# Patient Record
Sex: FEMALE | Race: BLACK OR AFRICAN AMERICAN | NOT HISPANIC OR LATINO | Employment: FULL TIME | URBAN - METROPOLITAN AREA
[De-identification: names, ages, dates, MRNs, and addresses within clinical notes are randomized per-mention and may not be internally consistent; named-entity substitution may affect disease eponyms.]

---

## 2018-07-07 ENCOUNTER — HOSPITAL ENCOUNTER (EMERGENCY)
Facility: HOSPITAL | Age: 33
Discharge: HOME | End: 2018-07-07
Attending: EMERGENCY MEDICINE
Payer: COMMERCIAL

## 2018-07-07 VITALS
TEMPERATURE: 98 F | WEIGHT: 255 LBS | RESPIRATION RATE: 18 BRPM | SYSTOLIC BLOOD PRESSURE: 136 MMHG | OXYGEN SATURATION: 100 % | BODY MASS INDEX: 42.43 KG/M2 | HEART RATE: 57 BPM | DIASTOLIC BLOOD PRESSURE: 70 MMHG

## 2018-07-07 DIAGNOSIS — R19.7 NAUSEA VOMITING AND DIARRHEA: Primary | ICD-10-CM

## 2018-07-07 DIAGNOSIS — R11.2 NAUSEA VOMITING AND DIARRHEA: Primary | ICD-10-CM

## 2018-07-07 LAB
ALBUMIN SERPL BCP-MCNC: 3.8 G/DL
ALP SERPL-CCNC: 88 U/L
ALT SERPL W/O P-5'-P-CCNC: 23 U/L
ANION GAP SERPL CALC-SCNC: 12 MMOL/L
AST SERPL-CCNC: 30 U/L
B-HCG UR QL: NEGATIVE
BILIRUB SERPL-MCNC: 0.5 MG/DL
BUN SERPL-MCNC: 8 MG/DL
CALCIUM SERPL-MCNC: 9.4 MG/DL
CHLORIDE SERPL-SCNC: 110 MMOL/L
CO2 SERPL-SCNC: 19 MMOL/L
CREAT SERPL-MCNC: 0.8 MG/DL
CTP QC/QA: YES
EST. GFR  (AFRICAN AMERICAN): >60 ML/MIN/1.73 M^2
EST. GFR  (NON AFRICAN AMERICAN): >60 ML/MIN/1.73 M^2
GLUCOSE SERPL-MCNC: 105 MG/DL
LIPASE SERPL-CCNC: 25 U/L
POTASSIUM SERPL-SCNC: 3.8 MMOL/L
PROT SERPL-MCNC: 7.9 G/DL
SODIUM SERPL-SCNC: 141 MMOL/L

## 2018-07-07 PROCEDURE — 25000003 PHARM REV CODE 250: Performed by: EMERGENCY MEDICINE

## 2018-07-07 PROCEDURE — 99284 EMERGENCY DEPT VISIT MOD MDM: CPT | Mod: 25

## 2018-07-07 PROCEDURE — 96372 THER/PROPH/DIAG INJ SC/IM: CPT

## 2018-07-07 PROCEDURE — 63600175 PHARM REV CODE 636 W HCPCS: Performed by: EMERGENCY MEDICINE

## 2018-07-07 PROCEDURE — 83690 ASSAY OF LIPASE: CPT

## 2018-07-07 PROCEDURE — 36415 COLL VENOUS BLD VENIPUNCTURE: CPT

## 2018-07-07 PROCEDURE — 96374 THER/PROPH/DIAG INJ IV PUSH: CPT

## 2018-07-07 PROCEDURE — 81025 URINE PREGNANCY TEST: CPT | Performed by: EMERGENCY MEDICINE

## 2018-07-07 PROCEDURE — 96361 HYDRATE IV INFUSION ADD-ON: CPT

## 2018-07-07 PROCEDURE — 80053 COMPREHEN METABOLIC PANEL: CPT

## 2018-07-07 RX ORDER — DIPHENOXYLATE HYDROCHLORIDE AND ATROPINE SULFATE 2.5; .025 MG/1; MG/1
1 TABLET ORAL
Status: COMPLETED | OUTPATIENT
Start: 2018-07-07 | End: 2018-07-07

## 2018-07-07 RX ORDER — ONDANSETRON 2 MG/ML
8 INJECTION INTRAMUSCULAR; INTRAVENOUS
Status: COMPLETED | OUTPATIENT
Start: 2018-07-07 | End: 2018-07-07

## 2018-07-07 RX ORDER — DIPHENOXYLATE HYDROCHLORIDE AND ATROPINE SULFATE 2.5; .025 MG/1; MG/1
1 TABLET ORAL 3 TIMES DAILY PRN
Qty: 12 TABLET | Refills: 0 | Status: SHIPPED | OUTPATIENT
Start: 2018-07-07 | End: 2018-07-17

## 2018-07-07 RX ORDER — ONDANSETRON 4 MG/1
4 TABLET, ORALLY DISINTEGRATING ORAL EVERY 8 HOURS PRN
Qty: 12 TABLET | Refills: 0 | Status: SHIPPED | OUTPATIENT
Start: 2018-07-07 | End: 2018-10-02

## 2018-07-07 RX ORDER — DICYCLOMINE HYDROCHLORIDE 10 MG/ML
20 INJECTION INTRAMUSCULAR
Status: COMPLETED | OUTPATIENT
Start: 2018-07-07 | End: 2018-07-07

## 2018-07-07 RX ORDER — DICYCLOMINE HYDROCHLORIDE 20 MG/1
20 TABLET ORAL 2 TIMES DAILY PRN
Qty: 20 TABLET | Refills: 0 | Status: SHIPPED | OUTPATIENT
Start: 2018-07-07 | End: 2018-08-06

## 2018-07-07 RX ADMIN — DICYCLOMINE HYDROCHLORIDE 20 MG: 10 INJECTION INTRAMUSCULAR at 12:07

## 2018-07-07 RX ADMIN — SODIUM CHLORIDE 1000 ML: 0.9 INJECTION, SOLUTION INTRAVENOUS at 12:07

## 2018-07-07 RX ADMIN — DIPHENOXYLATE HYDROCHLORIDE AND ATROPINE SULFATE 1 TABLET: 2.5; .025 TABLET ORAL at 12:07

## 2018-07-07 RX ADMIN — ONDANSETRON HYDROCHLORIDE 8 MG: 2 INJECTION INTRAMUSCULAR; INTRAVENOUS at 12:07

## 2018-07-07 NOTE — ED PROVIDER NOTES
Encounter Date: 2018    SCRIBE #1 NOTE: I, Leigha Cannon, am scribing for, and in the presence of, Dr. Goff.       History     Chief Complaint   Patient presents with    Diarrhea     C/o diarrhea since Tuesday    Emesis     Onset today.        2018  11:44 AM    Chief Complaint: Diarrhea      The patient is a 32 y.o. female who presents with persistent diarrhea for 4 days with associated loss of appetite, nausea, vomiting. Pt reports her diarrhea is clear. Her fiance and baby were sick with similar symptoms, but have resolved. Pt took Imodium yesterday and today at 0500, but this has not provided relief. No other exacerbating or alleviating factors. Denies fever. PMHx of Hypertension and PCOS. PSHx of  section. Negative pregnancy test at home.       The history is provided by the patient.     Review of patient's allergies indicates:  No Known Allergies  Past Medical History:   Diagnosis Date    Hypertension     PCOS (polycystic ovarian syndrome)      Past Surgical History:   Procedure Laterality Date     SECTION       History reviewed. No pertinent family history.  Social History   Substance Use Topics    Smoking status: Never Smoker    Smokeless tobacco: Not on file    Alcohol use Not on file     Review of Systems   Constitutional: Positive for appetite change. Negative for fever.   Gastrointestinal: Positive for abdominal pain, diarrhea, nausea and vomiting. Abdominal distention: mild.   Genitourinary: Negative for dysuria, flank pain and vaginal bleeding.   Musculoskeletal: Negative for myalgias.   Neurological: Negative for headaches.   Hematological: Negative for adenopathy.       Physical Exam     Initial Vitals [18 1108]   BP Pulse Resp Temp SpO2   (!) 160/90 60 16 98.3 °F (36.8 °C) 100 %      MAP       --         Physical Exam    Nursing note and vitals reviewed.  Constitutional: She appears well-developed and well-nourished.   HENT:   Head: Normocephalic and  atraumatic.   Eyes: EOM are normal.   Neck: Normal range of motion. Neck supple.   Cardiovascular: Normal rate, regular rhythm and normal heart sounds. Exam reveals no gallop and no friction rub.    No murmur heard.  Pulmonary/Chest: Breath sounds normal. No respiratory distress. She has no wheezes. She has no rhonchi. She has no rales.   Abdominal: Soft. Bowel sounds are normal. There is no tenderness.   No abdominal tenderness   Musculoskeletal: Normal range of motion.   Neurological: She is alert and oriented to person, place, and time.   Skin: Skin is warm and dry.   Psychiatric: She has a normal mood and affect. Her behavior is normal. Judgment and thought content normal.         ED Course   Procedures  Labs Reviewed - No data to display       Imaging Results    None          Medical Decision Making:   History:   Old Medical Records: I decided to obtain old medical records.  Clinical Tests:   Lab Tests: Ordered and Reviewed            Scribe Attestation:   Scribe #1: I performed the above scribed service and the documentation accurately describes the services I performed. I attest to the accuracy of the note.    I, Dr. Goff, personally performed the services described in this documentation. All medical record entries made by the scribe were at my direction and in my presence.  I have reviewed the chart and agree that the record reflects my personal performance and is accurate and complete.2:57 PM 07/07/2018            ED Course as of Jul 07 1457   Sat Jul 07, 2018   1116 BP: (!) 160/90 [EF]   1116 Temp: 98.3 °F (36.8 °C) [EF]   1116 Pulse: 60 [EF]   1116 Resp: 16 [EF]   1116 SpO2: 100 % [EF]   1207 Preg Test, Ur: Negative [EF]   1247 CO2: (!) 19 [EF]   1323 Feeling better  [EF]      ED Course User Index  [EF] Jose Cruz Goff MD     Clinical Impression:   The encounter diagnosis was Nausea vomiting and diarrhea.        32-year-old female presents with several days of nausea vomiting and diarrhea.  Multiple  family members sick with similar symptoms.  Abdominal exam is not peritoneal.  Labs demonstrate a very mild dehydration, patient feels much better after IV fluids and medications.  Symptoms almost certainly viral.  No indication for hospitalization or imaging.  No vomiting or diarrhea in the emergency room.                      Jose Cruz Goff MD  07/07/18 9838

## 2018-10-02 ENCOUNTER — OFFICE VISIT (OUTPATIENT)
Dept: SURGERY | Facility: CLINIC | Age: 33
End: 2018-10-02
Payer: COMMERCIAL

## 2018-10-02 VITALS
BODY MASS INDEX: 42.49 KG/M2 | HEIGHT: 65 IN | WEIGHT: 255 LBS | DIASTOLIC BLOOD PRESSURE: 84 MMHG | SYSTOLIC BLOOD PRESSURE: 140 MMHG

## 2018-10-02 DIAGNOSIS — K62.0 ANAL POLYP: Primary | ICD-10-CM

## 2018-10-02 LAB
BUN SERPL-MCNC: 13 MG/DL (ref 8–20)
CALCIUM SERPL-MCNC: 8.8 MG/DL (ref 7.7–10.4)
CHLORIDE: 104 MMOL/L (ref 98–110)
CO2 SERPL-SCNC: 26.1 MMOL/L (ref 22.8–31.6)
CREATININE: 0.76 MG/DL (ref 0.6–1.4)
GLUCOSE: 74 MG/DL (ref 70–99)
HCT VFR BLD AUTO: 39.6 % (ref 36–48)
HGB BLD-MCNC: 12.8 G/DL (ref 12–15)
MCH RBC QN AUTO: 27.5 PG (ref 25–35)
MCHC RBC AUTO-ENTMCNC: 32.3 G/DL (ref 31–36)
MCV RBC AUTO: 85 FL (ref 79–98)
NUCLEATED RBCS: 0 %
PLATELET # BLD AUTO: 293 K/UL (ref 140–440)
POTASSIUM SERPL-SCNC: 3.4 MMOL/L (ref 3.5–5)
RBC # BLD AUTO: 4.66 M/UL (ref 3.5–5.5)
SODIUM: 139 MMOL/L (ref 134–144)
WBC # BLD AUTO: 8.9 K/UL (ref 5–10)

## 2018-10-02 PROCEDURE — 99243 OFF/OP CNSLTJ NEW/EST LOW 30: CPT | Mod: ,,, | Performed by: SURGERY

## 2018-10-02 RX ORDER — HYDROCHLOROTHIAZIDE 25 MG/1
1 TABLET ORAL DAILY
COMMUNITY
Start: 2018-09-13 | End: 2019-08-17

## 2018-10-02 RX ORDER — METOPROLOL SUCCINATE 100 MG/1
1 TABLET, EXTENDED RELEASE ORAL DAILY
Status: ON HOLD | COMMUNITY
Start: 2018-09-13 | End: 2019-08-19 | Stop reason: SDUPTHER

## 2018-10-02 NOTE — PROGRESS NOTES
Subjective:       Patient ID: Nani Orr is a 33 y.o. female.    Chief Complaint: Consult (Referred by Dr. Patrick Rosales to eval hemorrhoids )      HPI:  Hemorrhoids: Patient complains of evaluation of possible hemorrhoids. Onset of symptoms was 3 years ago ago with unchanged course since that time.  She describes symptoms as perianal tags. Treatment to date has been none. Patient denies family hx of colorectal CA, maroon colored stools and melena.      Patient complains of a 3-4 year history of a anal lump. It occasionally becomes irritated and more inflamed and gets larger. It never bleeds. She does not have a problem with constipation or spotting of blood. It is mostly a cleanliness issue as it is in the way and never reduces. Patient tolerates regular diet. She denies abdominal pain or change in bowel habits.      Past Medical History:   Diagnosis Date    Hypertension     PCOS (polycystic ovarian syndrome)      Past Surgical History:   Procedure Laterality Date     SECTION  2009     SECTION  2016     Review of patient's allergies indicates:  No Known Allergies     Medication List           Accurate as of 10/2/18 11:22 AM. If you have any questions, ask your nurse or doctor.               CONTINUE taking these medications    hydroCHLOROthiazide 25 MG tablet  Commonly known as:  HYDRODIURIL     metoprolol succinate 100 MG 24 hr tablet  Commonly known as:  TOPROL-XL        STOP taking these medications    BYSTOLIC 10 MG Tab  Generic drug:  nebivolol  Stopped by:  Alethea Chan MD     norethindrone-ethinyl estradiol 1 mg-20 mcg (21)/75 mg (7) per tablet  Commonly known as:  JUNEL   Stopped by:  Alethea Chan MD     ondansetron 4 MG Tbdl  Commonly known as:  ZOFRAN-ODT  Stopped by:  Alethea Chan MD          Family History   Problem Relation Age of Onset    Hypertension Mother     Breast cancer Maternal Aunt     Heart disease Maternal Grandmother       Social History     Socioeconomic History    Marital status: Single     Spouse name: None    Number of children: None    Years of education: None    Highest education level: None   Social Needs    Financial resource strain: None    Food insecurity - worry: None    Food insecurity - inability: None    Transportation needs - medical: None    Transportation needs - non-medical: None   Occupational History    None   Tobacco Use    Smoking status: Never Smoker    Smokeless tobacco: Never Used   Substance and Sexual Activity    Alcohol use: Yes     Comment: Socially     Drug use: No    Sexual activity: None   Other Topics Concern    None   Social History Narrative    None         Review of Systems   Constitutional: Negative for appetite change, chills, fever and unexpected weight change.   HENT: Negative for hearing loss, rhinorrhea, sore throat and voice change.    Eyes: Negative for photophobia and visual disturbance.   Respiratory: Negative for cough, choking and shortness of breath.    Cardiovascular: Negative for chest pain, palpitations and leg swelling.   Gastrointestinal: Positive for rectal pain. Negative for abdominal pain, blood in stool, constipation, diarrhea, nausea and vomiting.   Endocrine: Negative for cold intolerance, heat intolerance, polydipsia and polyuria.   Musculoskeletal: Negative for arthralgias, back pain, joint swelling and neck stiffness.   Skin: Negative for color change, pallor and rash.   Neurological: Negative for dizziness, seizures, syncope and headaches.   Hematological: Negative for adenopathy. Does not bruise/bleed easily.   Psychiatric/Behavioral: Negative for agitation, behavioral problems and confusion.       Objective:      Physical Exam   Constitutional: She appears well-developed and well-nourished.  Non-toxic appearance. No distress.   HENT:   Head: Normocephalic and atraumatic. Head is without abrasion and without laceration.   Right Ear: External ear  normal.   Left Ear: External ear normal.   Nose: Nose normal.   Mouth/Throat: Oropharynx is clear and moist.   Eyes: EOM are normal. Pupils are equal, round, and reactive to light.   Neck: Trachea normal. No tracheal deviation and normal range of motion present. No thyroid mass and no thyromegaly present.   Cardiovascular: Normal rate and regular rhythm.   Pulmonary/Chest: Effort normal. No accessory muscle usage. No tachypnea. No respiratory distress.   Abdominal: Soft. Normal appearance and bowel sounds are normal. She exhibits no distension and no mass. There is no hepatosplenomegaly. There is no tenderness. There is no tenderness at McBurney's point and negative Balbuena's sign. No hernia.   Genitourinary: Rectal exam shows external hemorrhoid.   Genitourinary Comments: Anal polyp vs ext hem, not inflamed or thrombosed   Lymphadenopathy:     She has no cervical adenopathy.     She has no axillary adenopathy.        Right: No inguinal adenopathy present.        Left: No inguinal adenopathy present.   Neurological: She is alert. Coordination and gait normal.   Skin: Skin is warm and intact.   Psychiatric: She has a normal mood and affect. Her speech is normal and behavior is normal.       Assessment/Plan:   Anal polyp  -     Ambulatory Referral to External Surgery  -     Basic metabolic panel; Future; Expected date: 10/02/2018  -     CBC Without Differential; Future; Expected date: 10/02/2018  -     POCT urine pregnancy; Future; Expected date: 10/02/2018  -     X-Ray Chest PA And Lateral        Planned procedure: Excision Anal Polyp/Ext Hemorrhoid    Mefoxin 2 gm IV on call to OR    NPO past midnight    Nathan cloth scrub per protocol    SCDs Bilateral Lower Extremities    I discussed the proposed procedures the the patient including risks, benefits, indications, alternatives and special concerns.  The patient appears to understand and agrees to go ahead with surgery.  I have made no promises, warranties or verbal  agreements beyond what was discussed above.    No Follow-up on file.

## 2018-10-02 NOTE — LETTER
October 2, 2018      Patrick Rosales MD  [address]  [address]  [place] [address]           [facility] - General Surgery  [address]  [address]  [place] [address]  Phone: [phone]  Fax: [phone]          Patient: Nani Orr   MR Number: [id number]   YOB: 1985   Date of Visit: 10/2/2018       Dear Dr. Patrick Rosales:    Thank you for referring Nani Orr to me for evaluation. Attached you will find relevant portions of my assessment and plan of care.    If you have questions, please do not hesitate to call me. I look forward to following Nani Orr along with you.    Sincerely,    Alethea Chan MD    Enclosure  CC:  No Recipients    If you would like to receive this communication electronically, please contact [email or web address] or [phone] to request more information on [employer] access.    For providers and/or their staff who would like to refer a patient to [facility], please contact us through our one-stop-shop provider referral line, [facility], at [phone].    If you feel you have received this communication in error or would no longer like to receive these types of communications, please e-mail [email or web address]

## 2018-10-15 LAB — POTASSIUM SERPL-SCNC: 3.3 MMOL/L (ref 3.5–5)

## 2018-10-16 LAB — B-HCG UR QL: NEGATIVE

## 2018-10-18 ENCOUNTER — TELEPHONE (OUTPATIENT)
Dept: SURGERY | Facility: CLINIC | Age: 33
End: 2018-10-18

## 2018-10-18 NOTE — TELEPHONE ENCOUNTER
----- Message from Alethea Chan MD sent at 10/18/2018 12:01 PM CDT -----  Call patient - path benign

## 2018-10-25 ENCOUNTER — TELEPHONE (OUTPATIENT)
Dept: SURGERY | Facility: CLINIC | Age: 33
End: 2018-10-25

## 2018-10-25 NOTE — TELEPHONE ENCOUNTER
Pt had excision of anal polyp 10/15/18. Calling office stating she is still having a lot of leakage from incision site which is a pinkish/mauve color. She also states it feels like she has a hard lump there. I notified pt I will call her back after speaking with Dr. Chan.

## 2018-10-26 NOTE — TELEPHONE ENCOUNTER
I spoke to pt yesterday and informed her per Dr. Chan to come in for earlier post op appt on 10/30/18 to [facility].

## 2018-11-02 ENCOUNTER — OFFICE VISIT (OUTPATIENT)
Dept: SURGERY | Facility: CLINIC | Age: 33
End: 2018-11-02
Payer: COMMERCIAL

## 2018-11-02 VITALS
SYSTOLIC BLOOD PRESSURE: 140 MMHG | BODY MASS INDEX: 50.06 KG/M2 | DIASTOLIC BLOOD PRESSURE: 84 MMHG | WEIGHT: 255 LBS | HEIGHT: 60 IN | RESPIRATION RATE: 5 BRPM

## 2018-11-02 DIAGNOSIS — K62.0 ANAL POLYP: Primary | ICD-10-CM

## 2018-11-02 PROCEDURE — 99024 POSTOP FOLLOW-UP VISIT: CPT | Mod: ,,, | Performed by: SURGERY

## 2018-11-02 NOTE — PROGRESS NOTES
Subjective:       Patient ID: Nani Orr is a 33 y.o. female.    Chief Complaint: Post-op Evaluation (FU DOS 10/15/18 EUA; Excision of anal polyp)      HPI:  Nani Orr is here for post-op. Patient has no systemic complaints. Post operative   pain is under control.  Tolerating diet, no nausea/vomiting.  Having normal bowel movements.    Pt noticed a small lump immediately after surgery.  Denies pain.  No bleeding.      Objective:      Physical Exam   Constitutional: She is oriented to person, place, and time. She is cooperative. No distress.   Genitourinary:   Genitourinary Comments: Small lump present at surgical site.  May be effect of suture that has not dissolved yet.   Neurological: She is alert and oriented to person, place, and time.   Skin:   Incisions are clean, dry and intact   There is no evidence of infection, hematoma or seroma.        Assessment/Plan:   Anal polyp    Path - fibroepithelial polyp with HPV effect, low grade anal intraepithelial neoplasm (AIN)    Discussed path with pt.  Needs surveillance as AIN can be precurser to anal cancer.  F/U in 1 year to schedule EUA, sooner if lump does not resolve by 6 months.      Follow-up in about 1 year (around 11/2/2019).

## 2018-11-02 NOTE — LETTER
November 2, 2018      Patrick Rosales MD  [address]  [address]  [place] [address]           [facility] - General Surgery  [address]  [address]  [place] [address]  Phone: [phone]  Fax: [phone]          Patient: Nani Orr   MR Number: [id number]   YOB: 1985   Date of Visit: 11/2/2018       Dear Dr. Patrick Rosales:    Thank you for referring Nani Orr to me for evaluation. Attached you will find relevant portions of my assessment and plan of care.    If you have questions, please do not hesitate to call me. I look forward to following Nani Orr along with you.    Sincerely,    Alethea Chan MD    Enclosure  CC:  No Recipients    If you would like to receive this communication electronically, please contact [email or web address] or [phone] to request more information on [employer] access.    For providers and/or their staff who would like to refer a patient to [facility], please contact us through our one-stop-shop provider referral line, [facility], at [phone].    If you feel you have received this communication in error or would no longer like to receive these types of communications, please e-mail [email or web address]

## 2019-08-17 ENCOUNTER — HOSPITAL ENCOUNTER (OUTPATIENT)
Facility: HOSPITAL | Age: 34
Discharge: HOME | End: 2019-08-19
Attending: EMERGENCY MEDICINE | Admitting: HOSPITALIST
Payer: COMMERCIAL

## 2019-08-17 DIAGNOSIS — I45.10 RIGHT BUNDLE BRANCH BLOCK: Primary | ICD-10-CM

## 2019-08-17 DIAGNOSIS — I10 ACCELERATED HYPERTENSION: ICD-10-CM

## 2019-08-17 DIAGNOSIS — R07.9 CHEST PAIN: ICD-10-CM

## 2019-08-17 DIAGNOSIS — R07.9 CHEST PAIN IN ADULT: ICD-10-CM

## 2019-08-17 LAB
ALBUMIN SERPL BCP-MCNC: 4.3 G/DL (ref 3.5–5.2)
ALP SERPL-CCNC: 75 U/L (ref 55–135)
ALT SERPL W/O P-5'-P-CCNC: 33 U/L (ref 10–44)
ANION GAP SERPL CALC-SCNC: 11 MMOL/L (ref 8–16)
AST SERPL-CCNC: 24 U/L (ref 10–40)
BASOPHILS # BLD AUTO: 0.04 K/UL (ref 0–0.2)
BASOPHILS NFR BLD: 0.5 % (ref 0–1.9)
BILIRUB SERPL-MCNC: 0.3 MG/DL (ref 0.1–1)
BNP SERPL-MCNC: 39 PG/ML (ref 0–99)
BUN SERPL-MCNC: 8 MG/DL (ref 6–20)
CALCIUM SERPL-MCNC: 10.2 MG/DL (ref 8.7–10.5)
CHLORIDE SERPL-SCNC: 106 MMOL/L (ref 95–110)
CO2 SERPL-SCNC: 26 MMOL/L (ref 23–29)
CREAT SERPL-MCNC: 0.9 MG/DL (ref 0.5–1.4)
DIFFERENTIAL METHOD: ABNORMAL
EOSINOPHIL # BLD AUTO: 0.3 K/UL (ref 0–0.5)
EOSINOPHIL NFR BLD: 3.8 % (ref 0–8)
ERYTHROCYTE [DISTWIDTH] IN BLOOD BY AUTOMATED COUNT: 12.4 % (ref 11.5–14.5)
EST. GFR  (AFRICAN AMERICAN): >60 ML/MIN/1.73 M^2
EST. GFR  (NON AFRICAN AMERICAN): >60 ML/MIN/1.73 M^2
GLUCOSE SERPL-MCNC: 62 MG/DL (ref 70–110)
HCT VFR BLD AUTO: 45.4 % (ref 37–48.5)
HGB BLD-MCNC: 14.3 G/DL (ref 12–16)
IMM GRANULOCYTES # BLD AUTO: 0.06 K/UL (ref 0–0.04)
LYMPHOCYTES # BLD AUTO: 3.5 K/UL (ref 1–4.8)
LYMPHOCYTES NFR BLD: 42.6 % (ref 18–48)
MCH RBC QN AUTO: 27.2 PG (ref 27–31)
MCHC RBC AUTO-ENTMCNC: 31.5 G/DL (ref 32–36)
MCV RBC AUTO: 87 FL (ref 82–98)
MONOCYTES # BLD AUTO: 0.7 K/UL (ref 0.3–1)
MONOCYTES NFR BLD: 8.3 % (ref 4–15)
NEUTROPHILS # BLD AUTO: 3.6 K/UL (ref 1.8–7.7)
NEUTROPHILS NFR BLD: 44.1 % (ref 38–73)
NRBC BLD-RTO: 0 /100 WBC
PLATELET # BLD AUTO: 322 K/UL (ref 150–350)
PMV BLD AUTO: 9.6 FL (ref 9.2–12.9)
POTASSIUM SERPL-SCNC: 3.7 MMOL/L (ref 3.5–5.1)
PROT SERPL-MCNC: 7.7 G/DL (ref 6–8.4)
RBC # BLD AUTO: 5.25 M/UL (ref 4–5.4)
SODIUM SERPL-SCNC: 143 MMOL/L (ref 136–145)
TROPONIN I SERPL DL<=0.01 NG/ML-MCNC: <0.006 NG/ML (ref 0–0.03)
WBC # BLD AUTO: 8.24 K/UL (ref 3.9–12.7)

## 2019-08-17 PROCEDURE — 63600175 PHARM REV CODE 636 W HCPCS: Performed by: NURSE PRACTITIONER

## 2019-08-17 PROCEDURE — 99285 EMERGENCY DEPT VISIT HI MDM: CPT | Mod: 25

## 2019-08-17 PROCEDURE — 25000003 PHARM REV CODE 250: Performed by: EMERGENCY MEDICINE

## 2019-08-17 PROCEDURE — 96360 HYDRATION IV INFUSION INIT: CPT

## 2019-08-17 PROCEDURE — 80053 COMPREHEN METABOLIC PANEL: CPT

## 2019-08-17 PROCEDURE — 85025 COMPLETE CBC W/AUTO DIFF WBC: CPT

## 2019-08-17 PROCEDURE — 93005 ELECTROCARDIOGRAM TRACING: CPT

## 2019-08-17 PROCEDURE — G0378 HOSPITAL OBSERVATION PER HR: HCPCS

## 2019-08-17 PROCEDURE — 84484 ASSAY OF TROPONIN QUANT: CPT

## 2019-08-17 PROCEDURE — 36415 COLL VENOUS BLD VENIPUNCTURE: CPT

## 2019-08-17 PROCEDURE — 63600175 PHARM REV CODE 636 W HCPCS: Performed by: EMERGENCY MEDICINE

## 2019-08-17 PROCEDURE — 94760 N-INVAS EAR/PLS OXIMETRY 1: CPT

## 2019-08-17 PROCEDURE — 83880 ASSAY OF NATRIURETIC PEPTIDE: CPT

## 2019-08-17 PROCEDURE — 96372 THER/PROPH/DIAG INJ SC/IM: CPT | Mod: 59

## 2019-08-17 RX ORDER — AMOXICILLIN 250 MG
1 CAPSULE ORAL 2 TIMES DAILY
Status: DISCONTINUED | OUTPATIENT
Start: 2019-08-17 | End: 2019-08-19 | Stop reason: HOSPADM

## 2019-08-17 RX ORDER — SODIUM CHLORIDE 0.9 % (FLUSH) 0.9 %
10 SYRINGE (ML) INJECTION
Status: DISCONTINUED | OUTPATIENT
Start: 2019-08-17 | End: 2019-08-19 | Stop reason: HOSPADM

## 2019-08-17 RX ORDER — POTASSIUM CHLORIDE 20 MEQ/15ML
60 SOLUTION ORAL
Status: DISCONTINUED | OUTPATIENT
Start: 2019-08-17 | End: 2019-08-19 | Stop reason: HOSPADM

## 2019-08-17 RX ORDER — GLUCAGON 1 MG
1 KIT INJECTION
Status: DISCONTINUED | OUTPATIENT
Start: 2019-08-17 | End: 2019-08-19 | Stop reason: HOSPADM

## 2019-08-17 RX ORDER — ONDANSETRON 2 MG/ML
4 INJECTION INTRAMUSCULAR; INTRAVENOUS EVERY 6 HOURS PRN
Status: DISCONTINUED | OUTPATIENT
Start: 2019-08-17 | End: 2019-08-19 | Stop reason: HOSPADM

## 2019-08-17 RX ORDER — RAMELTEON 8 MG/1
8 TABLET ORAL NIGHTLY PRN
Status: DISCONTINUED | OUTPATIENT
Start: 2019-08-17 | End: 2019-08-19 | Stop reason: HOSPADM

## 2019-08-17 RX ORDER — LANOLIN ALCOHOL/MO/W.PET/CERES
800 CREAM (GRAM) TOPICAL
Status: DISCONTINUED | OUTPATIENT
Start: 2019-08-17 | End: 2019-08-19 | Stop reason: HOSPADM

## 2019-08-17 RX ORDER — ACETAMINOPHEN 325 MG/1
650 TABLET ORAL EVERY 4 HOURS PRN
Status: DISCONTINUED | OUTPATIENT
Start: 2019-08-17 | End: 2019-08-19 | Stop reason: HOSPADM

## 2019-08-17 RX ORDER — IBUPROFEN 200 MG
24 TABLET ORAL
Status: DISCONTINUED | OUTPATIENT
Start: 2019-08-17 | End: 2019-08-19 | Stop reason: HOSPADM

## 2019-08-17 RX ORDER — IBUPROFEN 200 MG
16 TABLET ORAL
Status: DISCONTINUED | OUTPATIENT
Start: 2019-08-17 | End: 2019-08-19 | Stop reason: HOSPADM

## 2019-08-17 RX ORDER — ASPIRIN 325 MG
325 TABLET ORAL
Status: COMPLETED | OUTPATIENT
Start: 2019-08-17 | End: 2019-08-17

## 2019-08-17 RX ORDER — POTASSIUM CHLORIDE 20 MEQ/15ML
40 SOLUTION ORAL
Status: DISCONTINUED | OUTPATIENT
Start: 2019-08-17 | End: 2019-08-19 | Stop reason: HOSPADM

## 2019-08-17 RX ORDER — ENOXAPARIN SODIUM 100 MG/ML
40 INJECTION SUBCUTANEOUS EVERY 24 HOURS
Status: DISCONTINUED | OUTPATIENT
Start: 2019-08-17 | End: 2019-08-18

## 2019-08-17 RX ORDER — CLONIDINE HYDROCHLORIDE 0.1 MG/1
0.1 TABLET ORAL
Status: COMPLETED | OUTPATIENT
Start: 2019-08-17 | End: 2019-08-17

## 2019-08-17 RX ADMIN — CLONIDINE HYDROCHLORIDE 0.1 MG: 0.1 TABLET ORAL at 08:08

## 2019-08-17 RX ADMIN — SODIUM CHLORIDE 500 ML: 0.9 INJECTION, SOLUTION INTRAVENOUS at 07:08

## 2019-08-17 RX ADMIN — ENOXAPARIN SODIUM 40 MG: 100 INJECTION SUBCUTANEOUS at 11:08

## 2019-08-17 RX ADMIN — NITROGLYCERIN 2 INCH: 20 OINTMENT TOPICAL at 08:08

## 2019-08-17 RX ADMIN — ASPIRIN 325 MG ORAL TABLET 325 MG: 325 PILL ORAL at 07:08

## 2019-08-17 NOTE — LETTER
August 19, 2019         [address] [place] [address]  [place] [address]  Phone: [phone]  Fax: [phone]       Patient: Nani Orr   YOB: 1985  Date of Visit: 08/19/2019    To Whom It May Concern:    Dalila Orr  with assistance of mother, Meg Barillas was at [facility] on 08/17/2019-08/19/2019. She may return to work/school on 08/19/2019 with no restrictions. If you have any questions or concerns, or if I can be of further assistance, please do not hesitate to contact me.    Sincerely,    Poli Roman LPN

## 2019-08-17 NOTE — LETTER
August 19, 2019         [address] [place] [address]  [place] [address]  Phone: [phone]  Fax: [phone]       Patient: Nani Orr   YOB: 1985  Date of Visit: 08/19/2019    To Whom It May Concern:    Dalila Orr   With assistance from mother, Kiana Madrigal was at [facility] on 08/17/2019-08/19/2019. She may return to work/school on 08/20/2019 with no restrictions. If you have any questions or concerns, or if I can be of further assistance, please do not hesitate to contact me.    Sincerely,    Poli Roman LPN

## 2019-08-17 NOTE — ED PROVIDER NOTES
"Encounter Date: 2019    SCRIBE #1 NOTE: I, Kelli Cardona and am scribing for, and in the presence of, Glenn Garza MD.       History     Chief Complaint   Patient presents with    Chest Pain     INtermittent chest "Heaviness" x 3 weeks and constant all day.  Endorses SOB.  Denies N/V, Diaphoresis     Time seen by provider: 7:14 PM on 2019    Nani Orr is a 34 y.o. female with PMHx of HTN who presents to the ED with an onset of intermittent chest heaviness starting 3 weeks ago. The patient reports that the heaviness is primarily located in the center of the chest. She reports occasional sharp pains to the right chest. She reports that the chest heaviness is not improved with rest. It is not worsened by movement or eating. She endorses some mild occasional SOB as well. She took GasX this morning with no relief of her symptoms. She takes metoprolol daily for her HTN and reports she took her dosage today. She reports increased stress over the past couple of weeks. She denies use of drugs or alcohol. She is not currently on birth control pills. The patient denies any other symptoms at this time. No pertinent PSHx. No known drug allergies noted.    The history is provided by the patient.     Review of patient's allergies indicates:  No Known Allergies  Past Medical History:   Diagnosis Date    Hypertension     PCOS (polycystic ovarian syndrome)      Past Surgical History:   Procedure Laterality Date     SECTION  2009     SECTION  2016    EUA; Excision of Anal Polyp  10/15/2018         Family History   Problem Relation Age of Onset    Hypertension Mother     Breast cancer Maternal Aunt     Heart disease Maternal Grandmother      Social History     Tobacco Use    Smoking status: Never Smoker    Smokeless tobacco: Never Used   Substance Use Topics    Alcohol use: Yes     Comment: Socially     Drug use: No     Review of Systems   Constitutional: " Negative for fever.   HENT: Negative for congestion.    Eyes: Negative for visual disturbance.   Respiratory: Positive for shortness of breath. Negative for wheezing.    Cardiovascular: Positive for chest pain (heaviness).   Gastrointestinal: Negative for abdominal pain.   Genitourinary: Negative for dysuria.   Musculoskeletal: Negative for joint swelling.   Skin: Negative for rash.   Neurological: Negative for syncope.   Hematological: Does not bruise/bleed easily.   Psychiatric/Behavioral: Negative for confusion.       Physical Exam     Initial Vitals   BP Pulse Resp Temp SpO2   08/17/19 1901 08/17/19 1904 -- 08/17/19 1903 08/17/19 1904   (!) 180/102 (!) 56  97.9 °F (36.6 °C) 100 %      MAP       --                Physical Exam    Nursing note and vitals reviewed.  Constitutional: She is Obese .   HENT:   Head: Normocephalic and atraumatic.   Eyes: Conjunctivae and EOM are normal.   Neck: Normal range of motion. Neck supple. No thyroid mass present.   Cardiovascular: Normal rate, regular rhythm and normal heart sounds. Exam reveals no gallop and no friction rub.    No murmur heard.  Pulmonary/Chest: Breath sounds normal. She has no wheezes. She has no rhonchi. She has no rales.   Abdominal: Soft. Normal appearance and bowel sounds are normal. There is no tenderness.   Neurological: She is alert and oriented to person, place, and time. She has normal strength. No cranial nerve deficit or sensory deficit.   Skin: Skin is warm and dry. No rash noted. No erythema.   Psychiatric: She has a normal mood and affect. Her speech is normal. Cognition and memory are normal.         ED Course   Procedures  Labs Reviewed   CBC W/ AUTO DIFFERENTIAL - Abnormal; Notable for the following components:       Result Value    Mean Corpuscular Hemoglobin Conc 31.5 (*)     Immature Grans (Abs) 0.06 (*)     All other components within normal limits   COMPREHENSIVE METABOLIC PANEL - Abnormal; Notable for the following components:     Glucose 62 (*)     All other components within normal limits   TROPONIN I   B-TYPE NATRIURETIC PEPTIDE   POCT URINE PREGNANCY     EKG Readings: (Independently Interpreted)   Initial Reading: No STEMI.   Sinus bradycardia with sinus arrhythmia at a rate of 56 bpm. Left axis deviation. Right bundle branch block. No STEMI.       Imaging Results          X-Ray Chest PA And Lateral (In process)                  Medical Decision Making:   History:   Old Medical Records: I decided to obtain old medical records.  Independently Interpreted Test(s):   I have ordered and independently interpreted EKG Reading(s) - see prior notes  Clinical Tests:   Lab Tests: Ordered and Reviewed  Radiological Study: Ordered and Reviewed  Medical Tests: Ordered and Reviewed  ED Management:  This is an emergent evaluation.  Patient is complaining of chest pressure on and off for 3 weeks.  My differential diagnosis includes: Acute MI, unstable angina, stable angina, congestive heart failure, pneumonia, pneumothorax, COPD/asthma, bronchitis, and mass.    Clinically, the patient does not have any symptoms of bronchitis, asthma, or COPD exacerbation.    An EKG was performed and was negative for ST segment elevation, but a note is made of RBBB without prior EKG for comparison.  A chest x-ray was performed and was negative for signs of heart failure or pulmonary edema.  There was no evidence of pneumonia or pneumothorax or mass lesion.    Cardiac markers-troponin and BNP-are both negative.  No evidence for NSTEMI or CHF.    This patient has multiple cardiac risk factors. Risk stratification with serial cardiac markers and stress testing is warranted in this patient.  I believe the patient should be admitted for observation to the definitively rule out acute coronary syndrome.    I discussed the patient's presentation and workup with the hospitalist NP who agrees with placing the patient in the hospital.  Patient and mother are agreeable with this  disposition and she is transported to an observation bed in guarded condition              Scribe Attestation:   Scribe #1: I performed the above scribed service and the documentation accurately describes the services I performed. I attest to the accuracy of the note.    I, Dr. Glenn Garza, personally performed the services described in this documentation. All medical record entries made by the scribe were at my direction and in my presence.  I have reviewed the chart and agree that the record reflects my personal performance and is accurate and complete. Glenn Garza MD.  3:26 AM 08/18/2019             Clinical Impression:       ICD-10-CM ICD-9-CM   1. Right bundle branch block I45.10 426.4   2. Chest pain R07.9 786.50   3. Accelerated hypertension I10 401.0         Disposition:   Disposition: Placed in Observation  Condition: Fair                        Glenn Garza MD  08/18/19 0329

## 2019-08-18 PROBLEM — I10 ACCELERATED HYPERTENSION: Status: ACTIVE | Noted: 2019-08-18

## 2019-08-18 PROBLEM — I45.10 RIGHT BUNDLE BRANCH BLOCK: Status: ACTIVE | Noted: 2019-08-18

## 2019-08-18 LAB
ANION GAP SERPL CALC-SCNC: 5 MMOL/L (ref 8–16)
AORTIC ROOT ANNULUS: 2.84 CM
AORTIC VALVE CUSP SEPERATION: 1.88 CM
AV INDEX (PROSTH): 1.09
AV MEAN GRADIENT: 5 MMHG
AV PEAK GRADIENT: 10 MMHG
AV VALVE AREA: 3.52 CM2
AV VELOCITY RATIO: 0.91
BASOPHILS # BLD AUTO: 0.04 K/UL (ref 0–0.2)
BASOPHILS NFR BLD: 0.7 % (ref 0–1.9)
BILIRUB UR QL STRIP: NEGATIVE
BSA FOR ECHO PROCEDURE: 2.38 M2
BUN SERPL-MCNC: 7 MG/DL (ref 6–20)
CALCIUM SERPL-MCNC: 9.6 MG/DL (ref 8.7–10.5)
CHLORIDE SERPL-SCNC: 108 MMOL/L (ref 95–110)
CHOLEST SERPL-MCNC: 180 MG/DL (ref 120–199)
CHOLEST/HDLC SERPL: 5.3 {RATIO} (ref 2–5)
CLARITY UR: CLEAR
CO2 SERPL-SCNC: 28 MMOL/L (ref 23–29)
COLOR UR: YELLOW
CREAT SERPL-MCNC: 0.8 MG/DL (ref 0.5–1.4)
CV ECHO LV RWT: 0.45 CM
DIFFERENTIAL METHOD: NORMAL
DOP CALC AO PEAK VEL: 1.62 M/S
DOP CALC AO VTI: 33.38 CM
DOP CALC LVOT AREA: 3.2 CM2
DOP CALC LVOT DIAMETER: 2.03 CM
DOP CALC LVOT PEAK VEL: 1.48 M/S
DOP CALC LVOT STROKE VOLUME: 117.56 CM3
DOP CALCLVOT PEAK VEL VTI: 36.34 CM
E WAVE DECELERATION TIME: 189.29 MSEC
E/A RATIO: 1.19
E/E' RATIO: 11.68 M/S
ECHO LV POSTERIOR WALL: 1.11 CM (ref 0.6–1.1)
EOSINOPHIL # BLD AUTO: 0.3 K/UL (ref 0–0.5)
EOSINOPHIL NFR BLD: 5 % (ref 0–8)
ERYTHROCYTE [DISTWIDTH] IN BLOOD BY AUTOMATED COUNT: 12.4 % (ref 11.5–14.5)
EST. GFR  (AFRICAN AMERICAN): >60 ML/MIN/1.73 M^2
EST. GFR  (NON AFRICAN AMERICAN): >60 ML/MIN/1.73 M^2
ESTIMATED AVG GLUCOSE: 88 MG/DL (ref 68–131)
FRACTIONAL SHORTENING: 36 % (ref 28–44)
GLUCOSE SERPL-MCNC: 91 MG/DL (ref 70–110)
GLUCOSE UR QL STRIP: NEGATIVE
HBA1C MFR BLD HPLC: 4.7 % (ref 4–5.6)
HCT VFR BLD AUTO: 39.7 % (ref 37–48.5)
HDLC SERPL-MCNC: 34 MG/DL (ref 40–75)
HDLC SERPL: 18.9 % (ref 20–50)
HGB BLD-MCNC: 12.7 G/DL (ref 12–16)
HGB UR QL STRIP: NEGATIVE
IMM GRANULOCYTES # BLD AUTO: 0.01 K/UL (ref 0–0.04)
INTERVENTRICULAR SEPTUM: 1.11 CM (ref 0.6–1.1)
IVRT: 0.11 MSEC
KETONES UR QL STRIP: NEGATIVE
LDLC SERPL CALC-MCNC: 122.2 MG/DL (ref 63–159)
LEFT ATRIUM SIZE: 3.76 CM
LEFT INTERNAL DIMENSION IN SYSTOLE: 3.21 CM (ref 2.1–4)
LEFT VENTRICLE DIASTOLIC VOLUME INDEX: 51.92 ML/M2
LEFT VENTRICLE DIASTOLIC VOLUME: 117.05 ML
LEFT VENTRICLE MASS INDEX: 92 G/M2
LEFT VENTRICLE SYSTOLIC VOLUME INDEX: 18.3 ML/M2
LEFT VENTRICLE SYSTOLIC VOLUME: 41.22 ML
LEFT VENTRICULAR INTERNAL DIMENSION IN DIASTOLE: 4.98 CM (ref 3.5–6)
LEFT VENTRICULAR MASS: 208.39 G
LEUKOCYTE ESTERASE UR QL STRIP: NEGATIVE
LV LATERAL E/E' RATIO: 11.1 M/S
LV SEPTAL E/E' RATIO: 12.33 M/S
LYMPHOCYTES # BLD AUTO: 2.6 K/UL (ref 1–4.8)
LYMPHOCYTES NFR BLD: 46.3 % (ref 18–48)
MAGNESIUM SERPL-MCNC: 1.8 MG/DL (ref 1.6–2.6)
MCH RBC QN AUTO: 27.3 PG (ref 27–31)
MCHC RBC AUTO-ENTMCNC: 32 G/DL (ref 32–36)
MCV RBC AUTO: 85 FL (ref 82–98)
MONOCYTES # BLD AUTO: 0.5 K/UL (ref 0.3–1)
MONOCYTES NFR BLD: 8.4 % (ref 4–15)
MV A" WAVE DURATION": 221 MSEC
MV PEAK A VEL: 0.93 M/S
MV PEAK E VEL: 1.11 M/S
NEUTROPHILS # BLD AUTO: 2.2 K/UL (ref 1.8–7.7)
NEUTROPHILS NFR BLD: 39.4 % (ref 38–73)
NITRITE UR QL STRIP: NEGATIVE
NONHDLC SERPL-MCNC: 146 MG/DL
NRBC BLD-RTO: 0 /100 WBC
PH UR STRIP: 6 [PH] (ref 5–8)
PHOSPHATE SERPL-MCNC: 3.6 MG/DL (ref 2.7–4.5)
PLATELET # BLD AUTO: 259 K/UL (ref 150–350)
PMV BLD AUTO: 9.6 FL (ref 9.2–12.9)
POTASSIUM SERPL-SCNC: 3.9 MMOL/L (ref 3.5–5.1)
PROT UR QL STRIP: NEGATIVE
PULM VEIN A" WAVE DURATION": 138 MSEC
PULM VEIN S/D RATIO: 1.31
PV PEAK D VEL: 0.45 M/S
PV PEAK S VEL: 0.59 M/S
PV PEAK VELOCITY: 1.15 CM/S
RA PRESSURE: 3 MMHG
RBC # BLD AUTO: 4.66 M/UL (ref 4–5.4)
RIGHT VENTRICULAR END-DIASTOLIC DIMENSION: 3.23 CM
SODIUM SERPL-SCNC: 141 MMOL/L (ref 136–145)
SP GR UR STRIP: 1.01 (ref 1–1.03)
TDI LATERAL: 0.1 M/S
TDI SEPTAL: 0.09 M/S
TDI: 0.1 M/S
TRICUSPID ANNULAR PLANE SYSTOLIC EXCURSION: 2.81 CM
TRIGL SERPL-MCNC: 119 MG/DL (ref 30–150)
TROPONIN I SERPL DL<=0.01 NG/ML-MCNC: <0.006 NG/ML (ref 0–0.03)
TROPONIN I SERPL DL<=0.01 NG/ML-MCNC: <0.006 NG/ML (ref 0–0.03)
TSH SERPL DL<=0.005 MIU/L-ACNC: 0.89 UIU/ML (ref 0.4–4)
URN SPEC COLLECT METH UR: NORMAL
UROBILINOGEN UR STRIP-ACNC: NEGATIVE EU/DL
WBC # BLD AUTO: 5.61 K/UL (ref 3.9–12.7)

## 2019-08-18 PROCEDURE — 80061 LIPID PANEL: CPT

## 2019-08-18 PROCEDURE — 63600175 PHARM REV CODE 636 W HCPCS: Performed by: HOSPITALIST

## 2019-08-18 PROCEDURE — G0378 HOSPITAL OBSERVATION PER HR: HCPCS

## 2019-08-18 PROCEDURE — 81003 URINALYSIS AUTO W/O SCOPE: CPT

## 2019-08-18 PROCEDURE — 80048 BASIC METABOLIC PNL TOTAL CA: CPT

## 2019-08-18 PROCEDURE — 84100 ASSAY OF PHOSPHORUS: CPT

## 2019-08-18 PROCEDURE — 25000003 PHARM REV CODE 250: Performed by: NURSE PRACTITIONER

## 2019-08-18 PROCEDURE — 84443 ASSAY THYROID STIM HORMONE: CPT

## 2019-08-18 PROCEDURE — 85025 COMPLETE CBC W/AUTO DIFF WBC: CPT

## 2019-08-18 PROCEDURE — 83036 HEMOGLOBIN GLYCOSYLATED A1C: CPT

## 2019-08-18 PROCEDURE — 84484 ASSAY OF TROPONIN QUANT: CPT | Mod: 91

## 2019-08-18 PROCEDURE — 36415 COLL VENOUS BLD VENIPUNCTURE: CPT

## 2019-08-18 PROCEDURE — 83735 ASSAY OF MAGNESIUM: CPT

## 2019-08-18 PROCEDURE — 94761 N-INVAS EAR/PLS OXIMETRY MLT: CPT

## 2019-08-18 PROCEDURE — 96372 THER/PROPH/DIAG INJ SC/IM: CPT

## 2019-08-18 RX ORDER — ENOXAPARIN SODIUM 100 MG/ML
40 INJECTION SUBCUTANEOUS EVERY 12 HOURS
Status: DISCONTINUED | OUTPATIENT
Start: 2019-08-18 | End: 2019-08-19 | Stop reason: HOSPADM

## 2019-08-18 RX ORDER — ASPIRIN 325 MG
325 TABLET ORAL DAILY
Status: DISCONTINUED | OUTPATIENT
Start: 2019-08-18 | End: 2019-08-19 | Stop reason: HOSPADM

## 2019-08-18 RX ORDER — CLONIDINE HYDROCHLORIDE 0.1 MG/1
0.1 TABLET ORAL EVERY 6 HOURS PRN
Status: DISCONTINUED | OUTPATIENT
Start: 2019-08-18 | End: 2019-08-19 | Stop reason: HOSPADM

## 2019-08-18 RX ADMIN — MAGNESIUM OXIDE TAB 400 MG (241.3 MG ELEMENTAL MG) 800 MG: 400 (241.3 MG) TAB at 05:08

## 2019-08-18 RX ADMIN — MAGNESIUM OXIDE TAB 400 MG (241.3 MG ELEMENTAL MG) 800 MG: 400 (241.3 MG) TAB at 10:08

## 2019-08-18 RX ADMIN — ENOXAPARIN SODIUM 40 MG: 100 INJECTION SUBCUTANEOUS at 12:08

## 2019-08-18 RX ADMIN — ENOXAPARIN SODIUM 40 MG: 100 INJECTION SUBCUTANEOUS at 08:08

## 2019-08-18 RX ADMIN — ASPIRIN 325 MG ORAL TABLET 325 MG: 325 PILL ORAL at 09:08

## 2019-08-18 NOTE — PROGRESS NOTES
SW met with patient, mother and aunt to complete a discharge planning assessment. Patient and Caregiver presents as alert and oriented x 4, pleasant, good eye contact, well groomed, recall good, concentration/judgement good, average intelligence, calm, communicative, cooperative and asking and answering questions appropriately. SW verified all information on demographic sheet is correct. Patient reports living with significant other and 2 minor children and that she is independent with ADLs at this time and does drive. Patient reports significant other and mother will transport pt home.    Patient reports does not have home health. . Patient reports that she is not receiving outside resources.  Patient reports having no medical equipment. Patient reports Patrick Rosales MD as pt's PCP and has [employer] as their insurance. Patient reports receiving medications from [facility] and reports that she is able to afford medications at this time and at time of discharge. Patient reports that she is not on dialysis at this time.  Patient reports that she is not receiving services with the coumadin clinic. Patient reports has not been admitted to the hospital within the last 30 days.    Patient reports does not have a Living Will or Healthcare Power of . Patient denies mental health issues.    Patient expressed no other needs at this time. SW remains available.         08/18/19 0257   Discharge Assessment   Assessment Type Discharge Planning Assessment   Confirmed/corrected address and phone number on facesheet? Yes   Assessment information obtained from? Patient;Caregiver   Prior to hospitilization cognitive status: Alert/Oriented   Prior to hospitalization functional status: Independent   Current cognitive status: Alert/Oriented   Current Functional Status: Independent   Lives With significant other;child(koki), dependent   Able to Return to Prior Arrangements yes   Is patient able to care  for self after discharge? Yes   Who are your caregiver(s) and their phone number(s)? Kory Rushing (significant other) [phone]; Tanya Madrigal (mother) [phone]   Patient's perception of discharge disposition home or selfcare   Readmission Within the Last 30 Days no previous admission in last 30 days   Patient currently being followed by outpatient case management? No   Patient currently receives any other outside agency services? No   Equipment Currently Used at Home none   Do you have any problems affording any of your prescribed medications? No   Is the patient taking medications as prescribed? yes   Does the patient have transportation home? Yes   Transportation Anticipated car, drives self;family or friend will provide   Discharge Plan A Home   DME Needed Upon Discharge  none   Patient/Family in Agreement with Plan yes

## 2019-08-18 NOTE — PROGRESS NOTES
The enoxaparin dose has been adjusted for Nani Orr [id number] according to the pharmacy practice protocol.      Based on the patient's Estimated Creatinine Clearance: 132.2 mL/min (based on SCr of 0.8 mg/dL)., Body mass index is 45.42 kg/m²., and weight= 123.8 kg (272 lb 14.9 oz), the enoxaparin dose has been adjusted 40 mg every 12 hours for CrCl =/>30 and BMI=/>40.    Thank you,  -Xavi Hammer, PharmD

## 2019-08-18 NOTE — SUBJECTIVE & OBJECTIVE
Interval History: no new issues. Still with midsternal/left sided chest pain. Denies SOB. Awaiting cardiology eval.    Review of Systems   Constitutional: Negative for diaphoresis, fatigue and fever.   Respiratory: Negative for cough and shortness of breath.    Cardiovascular: Positive for chest pain. Negative for leg swelling.   Gastrointestinal: Negative for abdominal distention, abdominal pain, constipation and nausea.   Musculoskeletal: Negative for arthralgias and back pain.   Skin: Negative for rash.   Neurological: Negative for speech difficulty and numbness.     Objective:     Vital Signs (Most Recent):  Temp: 97.2 °F (36.2 °C) (08/18/19 1552)  Pulse: (!) 42 (08/18/19 1552)  Resp: 20 (08/18/19 1552)  BP: 136/82 (08/18/19 1552)  SpO2: 99 % (08/18/19 1552) Vital Signs (24h Range):  Temp:  [37.4 °F (3 °C)-97.9 °F (36.6 °C)] 97.2 °F (36.2 °C)  Pulse:  [39-78] 42  Resp:  [18-20] 20  SpO2:  [96 %-100 %] 99 %  BP: (110-199)/() 136/82     Weight: 123.4 kg (272 lb)  Body mass index is 45.26 kg/m².  No intake or output data in the 24 hours ending 08/18/19 1805   Physical Exam   Constitutional: She is oriented to person, place, and time. She appears well-developed and well-nourished.   HENT:   Head: Normocephalic and atraumatic.   Mouth/Throat: Oropharynx is clear and moist.   Eyes: Pupils are equal, round, and reactive to light. Conjunctivae and EOM are normal.   Neck: Normal range of motion. Neck supple. No JVD present.   Cardiovascular: Normal rate, regular rhythm, normal heart sounds and intact distal pulses.   No murmur heard.  Pulmonary/Chest: Effort normal and breath sounds normal. She has no wheezes.   Abdominal: Soft. Bowel sounds are normal. There is no tenderness.   protuberant   Musculoskeletal: Normal range of motion.   Neurological: She is alert and oriented to person, place, and time. Coordination normal.   Skin: Skin is warm and dry. No erythema.   Psychiatric: She has a normal mood and affect.  Her behavior is normal. Judgment normal.   Nursing note and vitals reviewed.      Significant Labs:   BMP:   Recent Labs   Lab 08/18/19  0612   GLU 91      K 3.9      CO2 28   BUN 7   CREATININE 0.8   CALCIUM 9.6   MG 1.8     CBC:   Recent Labs   Lab 08/17/19  1915 08/18/19  0612   WBC 8.24 5.61   HGB 14.3 12.7   HCT 45.4 39.7    259       Significant Imaging: I have reviewed and interpreted all pertinent imaging results/findings within the past 24 hours.

## 2019-08-18 NOTE — CONSULTS
[facility]  Cardiology  Consult Note    Patient Name: Nani Orr  MRN: [id number]  Admission Date: 2019  Hospital Length of Stay: 0 days  Code Status: Full Code   Attending Provider: Kayden Almaraz MD   Consulting Provider: Mark Dasilva MD  Primary Care Physician: Patrick Rosales MD  Principal Problem:Chest pain    Patient information was obtained from patient and ER records.     Consults  Subjective:     Chief Complaint:   Chest pressure      HPI:   Pt  Having chest pressure x 2 weeks  - shes ckd her bp  And its  wnl ( 140/80 )   Came in er with cp and bp  Elevated- + fh hd, hi bp , dm . Non smoker.  She overwt and under stress-  She snores a lot according to  [facility] . Pain not exercise related     Past Medical History:   Diagnosis Date    Herpes simplex     Hypertension     PCOS (polycystic ovarian syndrome)        Past Surgical History:   Procedure Laterality Date     SECTION  2009     SECTION  2016    EUA; Excision of Anal Polyp  10/15/2018           Review of patient's allergies indicates:  No Known Allergies    No current facility-administered medications on file prior to encounter.      Current Outpatient Medications on File Prior to Encounter   Medication Sig    metoprolol succinate (TOPROL-XL) 100 MG 24 hr tablet Take 1 tablet by mouth once daily.     Family History     Problem Relation (Age of Onset)    Breast cancer Maternal Aunt    Heart disease Maternal Grandmother, Paternal Uncle    Hypertension Mother, Maternal Aunt        Tobacco Use    Smoking status: Never Smoker    Smokeless tobacco: Never Used   Substance and Sexual Activity    Alcohol use: Yes     Comment: Socially     Drug use: No    Sexual activity: Yes     Partners: Male     Comment: Partner has a phasectommy       Objective:     Vital Signs (Most Recent):  Temp: 97.2 °F (36.2 °C) (19 1552)  Pulse: (!) 42 (19 1552)  Resp: 20 (19  1552)  BP: 136/82 (08/18/19 1552)  SpO2: 99 % (08/18/19 1552) Vital Signs (24h Range):  Temp:  [37.4 °F (3 °C)-97.9 °F (36.6 °C)] 97.2 °F (36.2 °C)  Pulse:  [39-78] 42  Resp:  [18-20] 20  SpO2:  [96 %-100 %] 99 %  BP: (110-199)/() 136/82     Weight: 123.8 kg (272 lb 14.9 oz)  Body mass index is 45.42 kg/m².    SpO2: 99 %  O2 Device (Oxygen Therapy): room air    No intake or output data in the 24 hours ending 08/18/19 1554    Lines/Drains/Airways     Peripheral Intravenous Line                 Peripheral IV - Single Lumen 08/17/19 20 G Right Antecubital 1 day            ROS Heent  Ok   + wt gain   No  Sob     + DASHAWN symptoms   No exertinal  Angina  abd ok   LMP 7/29    extremities no  Clot     Physical Exam    140.80  Left  heent + goiter vs fat   Lungs clear cor reg no  m   abd obese   Extremities ok  Good pulses     Significant Labs:   CMP   Recent Labs   Lab 08/17/19 1915 08/18/19  0612    141   K 3.7 3.9    108   CO2 26 28   GLU 62* 91   BUN 8 7   CREATININE 0.9 0.8   CALCIUM 10.2 9.6   PROT 7.7  --    ALBUMIN 4.3  --    BILITOT 0.3  --    ALKPHOS 75  --    AST 24  --    ALT 33  --    ANIONGAP 11 5*   ESTGFRAFRICA >60 >60   EGFRNONAA >60 >60    and CBC   Recent Labs   Lab 08/17/19 1915 08/18/19  0612   WBC 8.24 5.61   HGB 14.3 12.7   HCT 45.4 39.7    259       Significant Imaging: EKG: rbbb   Assessment and Plan:   rbbb   DASHAWN   Exogenous obesity    cp- ro cad, anomalous  Coronaries etc  Hi bp ( bp, wt, dashawn most like aetiology)   Plan  Echo /   Nuclear est  Am   Active Diagnoses:    Diagnosis Date Noted POA    PRINCIPAL PROBLEM:  Chest pain [R07.9] 08/17/2019 Yes    Essential hypertension [I10] 08/18/2019 Yes    Right bundle branch block [I45.10] 08/18/2019 Yes      Problems Resolved During this Admission:       VTE Risk Mitigation (From admission, onward)        Ordered     enoxaparin injection 40 mg  Every 12 hours      08/18/19 1114     Place NADJA [facility]  Until discontinued       08/17/19 2142     IP VTE HIGH RISK PATIENT  Once      08/17/19 2142      I personally reviewed chart and imaging studies ,examined patient, and discussed with the patient her illness and treatment including diet and follow-up care. This consult was prolonged and required approximately 50% time for review and 50% time examining patient and writing notes and orders       Thank you for your consult.  Mark Dasilva MD  Cardiology   [facility]

## 2019-08-18 NOTE — PLAN OF CARE
"Problem: Adult Inpatient Plan of Care  Goal: Plan of Care Review  Outcome: Ongoing (interventions implemented as appropriate)  Pt remains free from injury. Family at bedside. Pt requests NO pork, NO red meat, and NO carrots. Cardiac monitoring maintained. TTE today. Cardiology consult to day. NPO after midnight for stress test tomorrow. C/o of mild intermittent "heaviness." Denied chest pain. Aspirin & Lovenox VTE. Ambulates independently.  AAOx4. Vital signs stable. Bed locked and low. Siderails raised x2. Non-skid socks on. Safety precautions maintained. Call light within reach. Instructed to call for assistance when needed. Pt verbalized understanding.         "

## 2019-08-18 NOTE — PROGRESS NOTES
[facility] Hospital Medicine  Progress Note    Patient Name: Nani Orr  MRN: [id number]  Patient Class: OP- Observation   Admission Date: 2019  Length of Stay: 0 days  Attending Physician: Kayden Almaraz MD  Primary Care Provider: Patrick Rosales MD        Subjective:     Principal Problem:Chest pain        HPI:  Nani Orr is a 34-year-old female with a past medical history significant for hypertension who presented to the emergency department today with a 3 week history of chest pressure.  Patient states that she tried to make an appointment with her primary care provider yesterday but she was unable to see her.  Today, the chest pressure became worse and she became short of breath.  Patient reports that her maternal grandmother  from an MI at the age of 49.  Denies fever, abdominal pain, nausea, vomiting, or dysuria.  Reports compliance with her prescribed beta-blocker.  Denies illicit drug use.  ED evaluation revealed an abnormal EKG.  She will be admitted to the service of hospital medicine for further treatment.    Overview/Hospital Course:  No notes on file    Interval History: no new issues. Still with midsternal/left sided chest pain. Denies SOB. Awaiting cardiology eval.    Review of Systems   Constitutional: Negative for diaphoresis, fatigue and fever.   Respiratory: Negative for cough and shortness of breath.    Cardiovascular: Positive for chest pain. Negative for leg swelling.   Gastrointestinal: Negative for abdominal distention, abdominal pain, constipation and nausea.   Musculoskeletal: Negative for arthralgias and back pain.   Skin: Negative for rash.   Neurological: Negative for speech difficulty and numbness.     Objective:     Vital Signs (Most Recent):  Temp: 97.2 °F (36.2 °C) (19 1552)  Pulse: (!) 42 (19 1552)  Resp: 20 (19 1552)  BP: 136/82 (19 1552)  SpO2: 99 % (19 155) Vital Signs (24h Range):  Temp:  [37.4 °F (3  °C)-97.9 °F (36.6 °C)] 97.2 °F (36.2 °C)  Pulse:  [39-78] 42  Resp:  [18-20] 20  SpO2:  [96 %-100 %] 99 %  BP: (110-199)/() 136/82     Weight: 123.4 kg (272 lb)  Body mass index is 45.26 kg/m².  No intake or output data in the 24 hours ending 08/18/19 1805   Physical Exam   Constitutional: She is oriented to person, place, and time. She appears well-developed and well-nourished.   HENT:   Head: Normocephalic and atraumatic.   Mouth/Throat: Oropharynx is clear and moist.   Eyes: Pupils are equal, round, and reactive to light. Conjunctivae and EOM are normal.   Neck: Normal range of motion. Neck supple. No JVD present.   Cardiovascular: Normal rate, regular rhythm, normal heart sounds and intact distal pulses.   No murmur heard.  Pulmonary/Chest: Effort normal and breath sounds normal. She has no wheezes.   Abdominal: Soft. Bowel sounds are normal. There is no tenderness.   protuberant   Musculoskeletal: Normal range of motion.   Neurological: She is alert and oriented to person, place, and time. Coordination normal.   Skin: Skin is warm and dry. No erythema.   Psychiatric: She has a normal mood and affect. Her behavior is normal. Judgment normal.   Nursing note and vitals reviewed.      Significant Labs:   BMP:   Recent Labs   Lab 08/18/19  0612   GLU 91      K 3.9      CO2 28   BUN 7   CREATININE 0.8   CALCIUM 9.6   MG 1.8     CBC:   Recent Labs   Lab 08/17/19  1915 08/18/19  0612   WBC 8.24 5.61   HGB 14.3 12.7   HCT 45.4 39.7    259       Significant Imaging: I have reviewed and interpreted all pertinent imaging results/findings within the past 24 hours.      Assessment/Plan:      * Chest pain  EKG prn Chest Pain  Troponin x 3  CBC, CMP, Mg, Phos daily  Fasting Lipid panel in am  TSH level  CXR-WNL  IV fluids  EMILIANO score-1  Consult Cardiology - follow recommendations  Consider Stress ECHO  Supplemental O2 via nasal cannula to keep O2 Sats >92%  Nitroglycerin prn  Start on ASA   Cards  consult pending.        Right bundle branch block  Appears to be a new finding.  Monitor telemetry.  Cardiology consult.      Essential hypertension  Chronic.  Uncontrolled.  Monitor blood pressure closely and treat as indicated for sustained control.        VTE Risk Mitigation (From admission, onward)        Ordered     enoxaparin injection 40 mg  Every 12 hours      08/18/19 1114     Place NADJA hose  Until discontinued      08/17/19 2142     IP VTE HIGH RISK PATIENT  Once      08/17/19 2142          Addendum: 440pm card evaluation. Plan for stress test in am.       Ruby Metcalf NP  Department of Hospital Medicine   [facility]

## 2019-08-18 NOTE — ASSESSMENT & PLAN NOTE
EKG prn Chest Pain  Troponin x 3  CBC, CMP, Mg, Phos daily  Fasting Lipid panel in am  TSH level  CXR-WNL  IV fluids  EMILIANO score-1  Consult Cardiology - follow recommendations  Consider Stress ECHO  Supplemental O2 via nasal cannula to keep O2 Sats >92%  Nitroglycerin prn  Start on ASA

## 2019-08-18 NOTE — ASSESSMENT & PLAN NOTE
Appears to be a new finding.  Monitor telemetry.  Cardiology consult.     Problem: Patient Care Overview  Goal: Plan of Care Review  Outcome: Ongoing (interventions implemented as appropriate)   06/20/19 1224   OTHER   Outcome Summary Pt up in room ad yogesh. Issued and educated on FM/GM HEP for coordination. AGUILAR discussed with pt and pt mother ADL modification and work simplification. Pt is currently back to his baseline prior to hospital admit and is discharged from OT this date.    Coping/Psychosocial   Plan of Care Reviewed With patient   Plan of Care Review   Progress improving

## 2019-08-18 NOTE — SUBJECTIVE & OBJECTIVE
Past Medical History:   Diagnosis Date    Herpes simplex     Hypertension     PCOS (polycystic ovarian syndrome)        Past Surgical History:   Procedure Laterality Date     SECTION  2009     SECTION  2016    EUA; Excision of Anal Polyp  10/15/2018           Review of patient's allergies indicates:  No Known Allergies    No current facility-administered medications on file prior to encounter.      Current Outpatient Medications on File Prior to Encounter   Medication Sig    metoprolol succinate (TOPROL-XL) 100 MG 24 hr tablet Take 1 tablet by mouth once daily.     Family History     Problem Relation (Age of Onset)    Breast cancer Maternal Aunt    Heart disease Maternal Grandmother, Paternal Uncle    Hypertension Mother, Maternal Aunt        Tobacco Use    Smoking status: Never Smoker    Smokeless tobacco: Never Used   Substance and Sexual Activity    Alcohol use: Yes     Comment: Socially     Drug use: No    Sexual activity: Yes     Partners: Male     Comment: Partner has a phasectommy     Review of Systems   Constitutional: Negative for appetite change, chills, fatigue and fever.   HENT: Negative for congestion and sore throat.    Eyes: Negative for photophobia and visual disturbance.   Respiratory: Positive for chest tightness and shortness of breath. Negative for cough and wheezing.    Cardiovascular: Positive for chest pain. Negative for palpitations.   Gastrointestinal: Negative for abdominal pain, diarrhea, nausea and vomiting.   Endocrine: Negative for polydipsia and polyuria.   Genitourinary: Negative for dysuria and flank pain.   Musculoskeletal: Negative for arthralgias and gait problem.   Skin: Negative for rash and wound.   Neurological: Negative for syncope and weakness.   Hematological: Negative for adenopathy. Does not bruise/bleed easily.   Psychiatric/Behavioral: Negative for agitation and confusion. The patient is nervous/anxious.    All other  systems reviewed and are negative.    Objective:     Vital Signs (Most Recent):  Temp: 97.1 °F (36.2 °C) (08/17/19 2148)  Pulse: (!) 40 (08/17/19 2148)  Resp: 20 (08/17/19 2148)  BP: (!) 159/88 (08/17/19 2148)  SpO2: 99 % (08/17/19 2148) Vital Signs (24h Range):  Temp:  [97.1 °F (36.2 °C)-97.9 °F (36.6 °C)] 97.1 °F (36.2 °C)  Pulse:  [40-58] 40  Resp:  [20] 20  SpO2:  [99 %-100 %] 99 %  BP: (159-199)/() 159/88     Weight: 123.8 kg (272 lb 14.9 oz)  Body mass index is 45.42 kg/m².    Physical Exam   Constitutional: She is oriented to person, place, and time. She appears well-developed and well-nourished.   HENT:   Head: Normocephalic and atraumatic.   Eyes: Pupils are equal, round, and reactive to light. Conjunctivae and EOM are normal.   Neck: Normal range of motion. Neck supple.   Cardiovascular: Normal rate, regular rhythm, normal heart sounds and intact distal pulses.   Pulmonary/Chest: Effort normal and breath sounds normal. No respiratory distress.   Abdominal: Soft. Bowel sounds are normal. She exhibits no distension. There is no tenderness.   Genitourinary:   Genitourinary Comments: Deferred   Musculoskeletal: Normal range of motion. She exhibits no edema.   Neurological: She is alert and oriented to person, place, and time.   Skin: Skin is warm and dry.   Psychiatric: She has a normal mood and affect. Her behavior is normal. Judgment and thought content normal.   Vitals reviewed.        CRANIAL NERVES     CN III, IV, VI   Pupils are equal, round, and reactive to light.  Extraocular motions are normal.        Significant Labs:   CBC:   Recent Labs   Lab 08/17/19 1915   WBC 8.24   HGB 14.3   HCT 45.4        CMP:   Recent Labs   Lab 08/17/19 1915      K 3.7      CO2 26   GLU 62*   BUN 8   CREATININE 0.9   CALCIUM 10.2   PROT 7.7   ALBUMIN 4.3   BILITOT 0.3   ALKPHOS 75   AST 24   ALT 33   ANIONGAP 11   EGFRNONAA >60     Troponin:   Recent Labs   Lab 08/17/19  1915   TROPONINI <0.006        Significant Imaging: CXR:  no acute pathology identified

## 2019-08-18 NOTE — ASSESSMENT & PLAN NOTE
Chronic.  Uncontrolled.  Monitor blood pressure closely and treat as indicated for sustained control.

## 2019-08-18 NOTE — ASSESSMENT & PLAN NOTE
EKG prn Chest Pain  Troponin x 3  CBC, CMP, Mg, Phos daily  Fasting Lipid panel in am  TSH level  CXR-WNL  IV fluids  EMILIANO score-1  Consult Cardiology - follow recommendations  Consider Stress ECHO  Supplemental O2 via nasal cannula to keep O2 Sats >92%  Nitroglycerin prn  Start on ASA   Cards consult pending.

## 2019-08-18 NOTE — H&P
"[facility] Hospital Medicine  History & Physical    Patient Name: Nani Orr  MRN: [id number]  Admission Date: 2019  Attending Physician: Kayden Almaraz MD   Primary Care Provider: Patrick Rosales MD         Patient information was obtained from patient, relative(s) and ER records.     Subjective:     Principal Problem:Chest pain    Chief Complaint:   Chief Complaint   Patient presents with    Chest Pain     INtermittent chest "Heaviness" x 3 weeks and constant all day.  Endorses SOB.  Denies N/V, Diaphoresis        HPI: Nani Orr is a 34-year-old female with a past medical history significant for hypertension who presented to the emergency department today with a 3 week history of chest pressure.  Patient states that she tried to make an appointment with her primary care provider yesterday but she was unable to see her.  Today, the chest pressure became worse and she became short of breath.  Patient reports that her maternal grandmother  from an MI at the age of 49.  Denies fever, abdominal pain, nausea, vomiting, or dysuria.  Reports compliance with her prescribed beta-blocker.  Denies illicit drug use.  ED evaluation revealed an abnormal EKG.  She will be admitted to the service of hospital medicine for further treatment.    Past Medical History:   Diagnosis Date    Herpes simplex     Hypertension     PCOS (polycystic ovarian syndrome)        Past Surgical History:   Procedure Laterality Date     SECTION  2009     SECTION  2016    EUA; Excision of Anal Polyp  10/15/2018           Review of patient's allergies indicates:  No Known Allergies    No current facility-administered medications on file prior to encounter.      Current Outpatient Medications on File Prior to Encounter   Medication Sig    metoprolol succinate (TOPROL-XL) 100 MG 24 hr tablet Take 1 tablet by mouth once daily.     Family History     Problem Relation " (Age of Onset)    Breast cancer Maternal Aunt    Heart disease Maternal Grandmother, Paternal Uncle    Hypertension Mother, Maternal Aunt        Tobacco Use    Smoking status: Never Smoker    Smokeless tobacco: Never Used   Substance and Sexual Activity    Alcohol use: Yes     Comment: Socially     Drug use: No    Sexual activity: Yes     Partners: Male     Comment: Partner has a phasectommy     Review of Systems   Constitutional: Negative for appetite change, chills, fatigue and fever.   HENT: Negative for congestion and sore throat.    Eyes: Negative for photophobia and visual disturbance.   Respiratory: Positive for chest tightness and shortness of breath. Negative for cough and wheezing.    Cardiovascular: Positive for chest pain. Negative for palpitations.   Gastrointestinal: Negative for abdominal pain, diarrhea, nausea and vomiting.   Endocrine: Negative for polydipsia and polyuria.   Genitourinary: Negative for dysuria and flank pain.   Musculoskeletal: Negative for arthralgias and gait problem.   Skin: Negative for rash and wound.   Neurological: Negative for syncope and weakness.   Hematological: Negative for adenopathy. Does not bruise/bleed easily.   Psychiatric/Behavioral: Negative for agitation and confusion. The patient is nervous/anxious.    All other systems reviewed and are negative.    Objective:     Vital Signs (Most Recent):  Temp: 97.1 °F (36.2 °C) (08/17/19 2148)  Pulse: (!) 40 (08/17/19 2148)  Resp: 20 (08/17/19 2148)  BP: (!) 159/88 (08/17/19 2148)  SpO2: 99 % (08/17/19 2148) Vital Signs (24h Range):  Temp:  [97.1 °F (36.2 °C)-97.9 °F (36.6 °C)] 97.1 °F (36.2 °C)  Pulse:  [40-58] 40  Resp:  [20] 20  SpO2:  [99 %-100 %] 99 %  BP: (159-199)/() 159/88     Weight: 123.8 kg (272 lb 14.9 oz)  Body mass index is 45.42 kg/m².    Physical Exam   Constitutional: She is oriented to person, place, and time. She appears well-developed and well-nourished.   HENT:   Head: Normocephalic and  atraumatic.   Eyes: Pupils are equal, round, and reactive to light. Conjunctivae and EOM are normal.   Neck: Normal range of motion. Neck supple.   Cardiovascular: Normal rate, regular rhythm, normal heart sounds and intact distal pulses.   Pulmonary/Chest: Effort normal and breath sounds normal. No respiratory distress.   Abdominal: Soft. Bowel sounds are normal. She exhibits no distension. There is no tenderness.   Genitourinary:   Genitourinary Comments: Deferred   Musculoskeletal: Normal range of motion. She exhibits no edema.   Neurological: She is alert and oriented to person, place, and time.   Skin: Skin is warm and dry.   Psychiatric: She has a normal mood and affect. Her behavior is normal. Judgment and thought content normal.   Vitals reviewed.        CRANIAL NERVES     CN III, IV, VI   Pupils are equal, round, and reactive to light.  Extraocular motions are normal.        Significant Labs:   CBC:   Recent Labs   Lab 08/17/19 1915   WBC 8.24   HGB 14.3   HCT 45.4        CMP:   Recent Labs   Lab 08/17/19 1915      K 3.7      CO2 26   GLU 62*   BUN 8   CREATININE 0.9   CALCIUM 10.2   PROT 7.7   ALBUMIN 4.3   BILITOT 0.3   ALKPHOS 75   AST 24   ALT 33   ANIONGAP 11   EGFRNONAA >60     Troponin:   Recent Labs   Lab 08/17/19 1915   TROPONINI <0.006       Significant Imaging: CXR:  no acute pathology identified    Assessment/Plan:     * Chest pain  EKG prn Chest Pain  Troponin x 3  CBC, CMP, Mg, Phos daily  Fasting Lipid panel in am  TSH level  CXR-WNL  IV fluids  EMILIANO score-1  Consult Cardiology - follow recommendations  Consider Stress ECHO  Supplemental O2 via nasal cannula to keep O2 Sats >92%  Nitroglycerin prn  Start on ASA         Right bundle branch block  Appears to be a new finding.  Monitor telemetry.  Cardiology consult.      Essential hypertension  Chronic.  Uncontrolled.  Monitor blood pressure closely and treat as indicated for sustained control.        VTE Risk Mitigation  (From admission, onward)        Ordered     enoxaparin injection 40 mg  Daily      08/17/19 2142     Place NADJA hose  Until discontinued      08/17/19 2142     IP VTE HIGH RISK PATIENT  Once      08/17/19 2142      Time spent seeing patient( greater than 1/2 spent in direct contact) : 45 minutes       ROSALIND Hawkins  Department of Hospital Medicine   [facility]

## 2019-08-18 NOTE — HPI
Nani Orr is a 34-year-old female with a past medical history significant for hypertension who presented to the emergency department today with a 3 week history of chest pressure.  Patient states that she tried to make an appointment with her primary care provider yesterday but she was unable to see her.  Today, the chest pressure became worse and she became short of breath.  Patient reports that her maternal grandmother  from an MI at the age of 49.  Denies fever, abdominal pain, nausea, vomiting, or dysuria.  Reports compliance with her prescribed beta-blocker.  Denies illicit drug use.  ED evaluation revealed an abnormal EKG.  She will be admitted to the service of hospital medicine for further treatment.

## 2019-08-18 NOTE — PLAN OF CARE
Problem: Adult Inpatient Plan of Care  Goal: Plan of Care Review  Outcome: Ongoing (interventions implemented as appropriate)  Pt will report decrease in chest pain within 48-72 hours, NPO after midnight,POC reviewed with pt, pt verbalizes understanding, monitor   heart rate and rhythm, monitor vitals,maintain quiet comfortable environment, provide rest periods between activities,  Encourage pt to notify nurse at the start of chest pain, NAD or SOB noted, safety maintained, will continue to monitor.

## 2019-08-18 NOTE — PLAN OF CARE
Problem: Adult Inpatient Plan of Care  Goal: Plan of Care Review  Outcome: Ongoing (interventions implemented as appropriate)  02 saturation 98% on room air.

## 2019-08-18 NOTE — PLAN OF CARE
08/17/19 9000   Patient Assessment/Suction   Level of Consciousness (AVPU) alert   PRE-TX-O2   O2 Device (Oxygen Therapy) room air   SpO2 99 %   Pulse Oximetry Type Intermittent   $ Pulse Oximetry - Single Charge Pulse Oximetry - Single

## 2019-08-19 VITALS
WEIGHT: 273.81 LBS | TEMPERATURE: 98 F | RESPIRATION RATE: 18 BRPM | OXYGEN SATURATION: 99 % | DIASTOLIC BLOOD PRESSURE: 80 MMHG | SYSTOLIC BLOOD PRESSURE: 145 MMHG | HEART RATE: 56 BPM | BODY MASS INDEX: 45.62 KG/M2 | HEIGHT: 65 IN

## 2019-08-19 LAB
ANION GAP SERPL CALC-SCNC: 8 MMOL/L (ref 8–16)
BASOPHILS # BLD AUTO: 0.03 K/UL (ref 0–0.2)
BASOPHILS NFR BLD: 0.6 % (ref 0–1.9)
BUN SERPL-MCNC: 13 MG/DL (ref 6–20)
CALCIUM SERPL-MCNC: 9.6 MG/DL (ref 8.7–10.5)
CHLORIDE SERPL-SCNC: 106 MMOL/L (ref 95–110)
CO2 SERPL-SCNC: 28 MMOL/L (ref 23–29)
CREAT SERPL-MCNC: 0.8 MG/DL (ref 0.5–1.4)
DIFFERENTIAL METHOD: ABNORMAL
EOSINOPHIL # BLD AUTO: 0.2 K/UL (ref 0–0.5)
EOSINOPHIL NFR BLD: 4.5 % (ref 0–8)
ERYTHROCYTE [DISTWIDTH] IN BLOOD BY AUTOMATED COUNT: 12.5 % (ref 11.5–14.5)
EST. GFR  (AFRICAN AMERICAN): >60 ML/MIN/1.73 M^2
EST. GFR  (NON AFRICAN AMERICAN): >60 ML/MIN/1.73 M^2
GLUCOSE SERPL-MCNC: 88 MG/DL (ref 70–110)
HCT VFR BLD AUTO: 40.8 % (ref 37–48.5)
HGB BLD-MCNC: 13.1 G/DL (ref 12–16)
IMM GRANULOCYTES # BLD AUTO: 0.02 K/UL (ref 0–0.04)
LYMPHOCYTES # BLD AUTO: 2.4 K/UL (ref 1–4.8)
LYMPHOCYTES NFR BLD: 47.6 % (ref 18–48)
MAGNESIUM SERPL-MCNC: 1.9 MG/DL (ref 1.6–2.6)
MCH RBC QN AUTO: 26.8 PG (ref 27–31)
MCHC RBC AUTO-ENTMCNC: 32.1 G/DL (ref 32–36)
MCV RBC AUTO: 83 FL (ref 82–98)
MONOCYTES # BLD AUTO: 0.5 K/UL (ref 0.3–1)
MONOCYTES NFR BLD: 9.5 % (ref 4–15)
NEUTROPHILS # BLD AUTO: 1.9 K/UL (ref 1.8–7.7)
NEUTROPHILS NFR BLD: 37.4 % (ref 38–73)
NRBC BLD-RTO: 0 /100 WBC
PHOSPHATE SERPL-MCNC: 4.4 MG/DL (ref 2.7–4.5)
PLATELET # BLD AUTO: 283 K/UL (ref 150–350)
PMV BLD AUTO: 9.8 FL (ref 9.2–12.9)
POCT GLUCOSE: 76 MG/DL (ref 70–110)
POTASSIUM SERPL-SCNC: 4 MMOL/L (ref 3.5–5.1)
RBC # BLD AUTO: 4.89 M/UL (ref 4–5.4)
SODIUM SERPL-SCNC: 142 MMOL/L (ref 136–145)
WBC # BLD AUTO: 5.06 K/UL (ref 3.9–12.7)

## 2019-08-19 PROCEDURE — 63600175 PHARM REV CODE 636 W HCPCS: Performed by: SPECIALIST

## 2019-08-19 PROCEDURE — 96372 THER/PROPH/DIAG INJ SC/IM: CPT

## 2019-08-19 PROCEDURE — G0378 HOSPITAL OBSERVATION PER HR: HCPCS

## 2019-08-19 PROCEDURE — 94761 N-INVAS EAR/PLS OXIMETRY MLT: CPT

## 2019-08-19 PROCEDURE — 63600175 PHARM REV CODE 636 W HCPCS: Performed by: HOSPITALIST

## 2019-08-19 PROCEDURE — 85025 COMPLETE CBC W/AUTO DIFF WBC: CPT

## 2019-08-19 PROCEDURE — 80048 BASIC METABOLIC PNL TOTAL CA: CPT

## 2019-08-19 PROCEDURE — 84100 ASSAY OF PHOSPHORUS: CPT

## 2019-08-19 PROCEDURE — 25000003 PHARM REV CODE 250: Performed by: NURSE PRACTITIONER

## 2019-08-19 PROCEDURE — 83735 ASSAY OF MAGNESIUM: CPT

## 2019-08-19 PROCEDURE — 36415 COLL VENOUS BLD VENIPUNCTURE: CPT

## 2019-08-19 RX ORDER — METOPROLOL SUCCINATE 50 MG/1
100 TABLET, EXTENDED RELEASE ORAL DAILY
Qty: 60 TABLET | Refills: 0 | Status: SHIPPED | OUTPATIENT
Start: 2019-08-19 | End: 2019-08-19 | Stop reason: SDUPTHER

## 2019-08-19 RX ORDER — METOPROLOL SUCCINATE 50 MG/1
50 TABLET, EXTENDED RELEASE ORAL DAILY
Qty: 30 TABLET | Refills: 0 | Status: SHIPPED | OUTPATIENT
Start: 2019-08-19 | End: 2019-12-10 | Stop reason: SDUPTHER

## 2019-08-19 RX ORDER — HYDROCHLOROTHIAZIDE 25 MG/1
25 TABLET ORAL DAILY
Qty: 30 TABLET | Refills: 0 | Status: SHIPPED | OUTPATIENT
Start: 2019-08-19 | End: 2019-12-10 | Stop reason: SDUPTHER

## 2019-08-19 RX ORDER — REGADENOSON 0.08 MG/ML
0.4 INJECTION, SOLUTION INTRAVENOUS ONCE
Status: COMPLETED | OUTPATIENT
Start: 2019-08-19 | End: 2019-08-19

## 2019-08-19 RX ADMIN — ENOXAPARIN SODIUM 40 MG: 100 INJECTION SUBCUTANEOUS at 09:08

## 2019-08-19 RX ADMIN — REGADENOSON 0.4 MG: 0.08 INJECTION, SOLUTION INTRAVENOUS at 12:08

## 2019-08-19 RX ADMIN — ASPIRIN 325 MG ORAL TABLET 325 MG: 325 PILL ORAL at 09:08

## 2019-08-19 NOTE — PLAN OF CARE
08/19/19 1646   Final Note   Assessment Type Final Discharge Note   Anticipated Discharge Disposition Home

## 2019-08-19 NOTE — PLAN OF CARE
CM attempted to schedule hospital follow up with PCP- per , Dr. Hill's nurse will call pt with appt.      08/19/19 1899   Discharge Assessment   Assessment Type Discharge Planning Reassessment

## 2019-08-19 NOTE — PLAN OF CARE
08/18/19 1937   Patient Assessment/Suction   Level of Consciousness (AVPU) alert   PRE-TX-O2   O2 Device (Oxygen Therapy) room air   SpO2 98 %   Pulse Oximetry Type Intermittent

## 2019-08-19 NOTE — PLAN OF CARE
08/19/19 0802   PRE-TX-O2   O2 Device (Oxygen Therapy) room air   SpO2 99 %   Pulse Oximetry Type Intermittent   $ Pulse Oximetry - Multiple Charge Pulse Oximetry - Multiple

## 2019-08-20 ENCOUNTER — TELEPHONE (OUTPATIENT)
Dept: MEDSURG UNIT | Facility: HOSPITAL | Age: 34
End: 2019-08-20

## 2019-08-20 LAB
CV STRESS BASE HR: 47 BPM
DIASTOLIC BLOOD PRESSURE: 98 MMHG
OHS CV CPX 85 PERCENT MAX PREDICTED HEART RATE MALE: 150
OHS CV CPX MAX PREDICTED HEART RATE: 177
OHS CV CPX PATIENT IS FEMALE: 1
OHS CV CPX PATIENT IS MALE: 0
OHS CV CPX PEAK DIASTOLIC BLOOD PRESSURE: 106 MMHG
OHS CV CPX PEAK HEAR RATE: 133 BPM
OHS CV CPX PEAK RATE PRESSURE PRODUCT: NORMAL
OHS CV CPX PEAK SYSTOLIC BLOOD PRESSURE: 217 MMHG
OHS CV CPX PERCENT MAX PREDICTED HEART RATE ACHIEVED: 75
OHS CV CPX RATE PRESSURE PRODUCT PRESENTING: 6721
STRESS ECHO POST EXERCISE DUR MIN: 3 MINUTES
STRESS ECHO POST EXERCISE DUR SEC: 30 SECONDS
STRESS ECHO TARGET HR: 158.95 BPM
SYSTOLIC BLOOD PRESSURE: 143 MMHG

## 2019-08-20 NOTE — DISCHARGE SUMMARY
[facility]  Hospital Medicine  Discharge Summary      Patient Name: Nani Orr  MRN: [id number]  Admission Date: 2019  Hospital Length of Stay: 0 days  Discharge Date and Time: 2019  6:00 PM  Attending Physician: Mallorie att. providers found   Discharging Provider: Ruby Metcalf NP  Primary Care Provider: Patrick Rosales MD      HPI:   Nani Orr is a 34-year-old female with a past medical history significant for hypertension who presented to the emergency department today with a 3 week history of chest pressure.  Patient states that she tried to make an appointment with her primary care provider yesterday but she was unable to see her.  Today, the chest pressure became worse and she became short of breath.  Patient reports that her maternal grandmother  from an MI at the age of 49.  Denies fever, abdominal pain, nausea, vomiting, or dysuria.  Reports compliance with her prescribed beta-blocker.  Denies illicit drug use.  ED evaluation revealed an abnormal EKG.  She will be admitted to the service of hospital medicine for further treatment.    * No surgery found *      Hospital Course:   Patient monitor closely during observation stay.  Troponin trended and remained negative x3 upon admit she was noted to have abnormal EKG with right bundle branch block.  Cardiology consulted.  She underwent cardiac stress test which is negative for myocardial ischemia.  Echocardiogram obtained see results below.  Patient is stable for discharge from Cardiology standpoint with addition of hydrochlorothiazide 25 mg daily.    Patient expresses concern regarding recent heart rate in the 40s although she was asymptomatic.  She is concerned with taking metoprolol 100 mg daily.  Instructed patient to take 50 mg daily in addition to hydrochlorothiazide 25 mg daily and follow up with Cardiology.  She is to keep blood pressure and heart rate diary.  Educated on importance of diet and exercise for  weight loss.  States understanding.    Physical exam  Chest clear auscultation  Heart regular rate and rhythm  Patient tolerating diet and eating well.     Consults:     No new Assessment & Plan notes have been filed under this hospital service since the last note was generated.  Service: Hospital Medicine    Final Active Diagnoses:    Diagnosis Date Noted POA    PRINCIPAL PROBLEM:  Chest pain [R07.9] 08/17/2019 Yes    Essential hypertension [I10] 08/18/2019 Yes    Right bundle branch block [I45.10] 08/18/2019 Yes      Problems Resolved During this Admission:       Discharged Condition: stable    Disposition: Home or Self Care    Follow Up:  Follow-up Information     Patrick Rosales MD In 1 week.    Specialty:  Family Medicine  Why:  hospital follow up  Contact information:  [address]  [address]  [place] [address]  [phone]             Mark Dasilva MD.    Specialties:  Cardiovascular Disease, Cardiology  Contact information:  [address]  [address]  [place] [address]  [phone]                 Patient Instructions:      Diet Cardiac     Diet Cardiac     Notify your health care provider if you experience any of the following:  persistent dizziness, light-headedness, or visual disturbances     Notify your health care provider if you experience any of the following:  severe persistent headache     Notify your health care provider if you experience any of the following:  worsening rash     Notify your health care provider if you experience any of the following:  difficulty breathing or increased cough     Notify your health care provider if you experience any of the following:  redness, tenderness, or signs of infection (pain, swelling, redness, odor or green/yellow discharge around incision site)     Notify your health care provider if you experience any of the following:  severe uncontrolled pain     Notify your health care provider if you experience any of the following:  persistent nausea  and vomiting or diarrhea     Notify your health care provider if you experience any of the following:  temperature >100.4     Notify your health care provider if you experience any of the following:  worsening rash     Notify your health care provider if you experience any of the following:  severe persistent headache     Notify your health care provider if you experience any of the following:  difficulty breathing or increased cough     Notify your health care provider if you experience any of the following:  redness, tenderness, or signs of infection (pain, swelling, redness, odor or green/yellow discharge around incision site)     Notify your health care provider if you experience any of the following:  severe uncontrolled pain     Notify your health care provider if you experience any of the following:  persistent nausea and vomiting or diarrhea     Notify your health care provider if you experience any of the following:  temperature >100.4     Notify your health care provider if you experience any of the following:  persistent dizziness, light-headedness, or visual disturbances     Activity as tolerated     Activity as tolerated       Significant Diagnostic Studies: Labs:   BMP:   Recent Labs   Lab 08/19/19  0611   GLU 88      K 4.0      CO2 28   BUN 13   CREATININE 0.8   CALCIUM 9.6   MG 1.9    and CMP   Recent Labs   Lab 08/19/19  0611      K 4.0      CO2 28   GLU 88   BUN 13   CREATININE 0.8   CALCIUM 9.6   ANIONGAP 8   ESTGFRAFRICA >60   EGFRNONAA >60     Cardiac Graphics: Echocardiogram:   Transthoracic echo (TTE) complete (Cupid Only):   Results for orders placed or performed during the hospital encounter of 08/17/19   Transthoracic echo (TTE) 2D with Color Flow   Result Value Ref Range    BSA 2.38 m2    TDI SEPTAL 0.09 m/s    LV LATERAL E/E' RATIO 11.10 m/s    LV SEPTAL E/E' RATIO 12.33 m/s    AORTIC VALVE CUSP SEPERATION 1.88 cm    TDI LATERAL 0.10 m/s    PV PEAK VELOCITY 1.15  "cm/s    LVIDD 4.98 3.5 - 6.0 cm    IVS 1.11 (A) 0.6 - 1.1 cm    PW 1.11 (A) 0.6 - 1.1 cm    Ao root annulus 2.84 cm    LVIDS 3.21 2.1 - 4.0 cm    FS 36 28 - 44 %    LV mass 208.39 g    LA size 3.76 cm    RVDD 3.23 cm    TAPSE 2.81 cm    Left Ventricle Relative Wall Thickness 0.45 cm    AV mean gradient 5 mmHg    AV valve area 3.52 cm2    AV Velocity Ratio 0.91     AV index (prosthetic) 1.09     E/A ratio 1.19     Mean e' 0.10 m/s    E wave decelartion time 189.29 msec    IVRT 0.11 msec    MV "A" wave duration 221.00 msec    Pulm vein "A" wave 138.00 msec    Pulm vein S/D ratio 1.31     LVOT diameter 2.03 cm    LVOT area 3.2 cm2    LVOT peak markel 1.48 m/s    LVOT peak VTI 36.34 cm    Ao peak markel 1.62 m/s    Ao VTI 33.38 cm    LVOT stroke volume 117.56 cm3    AV peak gradient 10 mmHg    E/E' ratio 11.68 m/s    MV Peak E Markel 1.11 m/s    MV Peak A Markel 0.93 m/s    PV Peak S Markel 0.59 m/s    PV Peak D Markel 0.45 m/s    LV Systolic Volume 41.22 mL    LV Systolic Volume Index 18.3 mL/m2    LV Diastolic Volume 117.05 mL    LV Diastolic Volume Index 51.92 mL/m2    LV Mass Index 92 g/m2    Right Atrial Pressure (from IVC) 3 mmHg    Narrative    · Left ventricular systolic function. The estimated ejection fraction is   65%  · Normal LV diastolic function.  · Normal left atrial pressure.  · Normal right ventricular systolic function.  · Mild left atrial enlargement.  · Mild pulmonic regurgitation.  · Normal central venous pressure (3 mm Hg).          Pending Diagnostic Studies:     None         Medications:  Reconciled Home Medications:      Medication List      START taking these medications    metoprolol succinate 50 MG 24 hr tablet  Commonly known as:  TOPROL-XL  Take 1 tablet (50 mg total) by mouth once daily.        CONTINUE taking these medications    hydroCHLOROthiazide 25 MG tablet  Commonly known as:  HYDRODIURIL  Take 1 tablet (25 mg total) by mouth once daily.            Indwelling Lines/Drains at time of discharge: "   Lines/Drains/Airways          None          Time spent on the discharge of patient: 50 minutes  Patient was seen and examined on the date of discharge and determined to be suitable for discharge.         Ruby Metcalf NP  Department of Hospital Medicine  [facility]

## 2019-08-20 NOTE — HOSPITAL COURSE
Patient monitor closely during observation stay.  Troponin trended and remained negative x3 upon admit she was noted to have abnormal EKG with right bundle branch block.  Cardiology consulted.  She underwent cardiac stress test which is negative for myocardial ischemia.  Echocardiogram obtained see results below.  Patient is stable for discharge from Cardiology standpoint with addition of hydrochlorothiazide 25 mg daily.    Patient expresses concern regarding recent heart rate in the 40s although she was asymptomatic.  She is concerned with taking metoprolol 100 mg daily.  Instructed patient to take 50 mg daily in addition to hydrochlorothiazide 25 mg daily and follow up with Cardiology.  She is to keep blood pressure and heart rate diary.  Educated on importance of diet and exercise for weight loss.  States understanding.    Physical exam  Chest clear auscultation  Heart regular rate and rhythm  Patient tolerating diet and eating well.

## 2019-12-10 ENCOUNTER — OFFICE VISIT (OUTPATIENT)
Dept: FAMILY MEDICINE | Facility: CLINIC | Age: 34
End: 2019-12-10
Payer: COMMERCIAL

## 2019-12-10 VITALS
HEIGHT: 65 IN | BODY MASS INDEX: 45.32 KG/M2 | HEART RATE: 72 BPM | WEIGHT: 272 LBS | DIASTOLIC BLOOD PRESSURE: 88 MMHG | SYSTOLIC BLOOD PRESSURE: 112 MMHG

## 2019-12-10 DIAGNOSIS — E28.2 PCOS (POLYCYSTIC OVARIAN SYNDROME): ICD-10-CM

## 2019-12-10 DIAGNOSIS — I10 ESSENTIAL HYPERTENSION: Primary | ICD-10-CM

## 2019-12-10 DIAGNOSIS — E04.2 MULTINODULAR GOITER: ICD-10-CM

## 2019-12-10 DIAGNOSIS — M54.2 NECK PAIN: ICD-10-CM

## 2019-12-10 PROBLEM — E66.01 MORBID OBESITY: Status: ACTIVE | Noted: 2018-04-02

## 2019-12-10 PROCEDURE — 99214 OFFICE O/P EST MOD 30 MIN: CPT | Mod: S$GLB,,, | Performed by: FAMILY MEDICINE

## 2019-12-10 PROCEDURE — 99214 PR OFFICE/OUTPT VISIT, EST, LEVL IV, 30-39 MIN: ICD-10-PCS | Mod: S$GLB,,, | Performed by: FAMILY MEDICINE

## 2019-12-10 PROCEDURE — 3008F BODY MASS INDEX DOCD: CPT | Mod: S$GLB,,, | Performed by: FAMILY MEDICINE

## 2019-12-10 PROCEDURE — 3008F PR BODY MASS INDEX (BMI) DOCUMENTED: ICD-10-PCS | Mod: S$GLB,,, | Performed by: FAMILY MEDICINE

## 2019-12-10 RX ORDER — HYDROCHLOROTHIAZIDE 25 MG/1
25 TABLET ORAL DAILY
Qty: 90 TABLET | Refills: 3 | Status: SHIPPED | OUTPATIENT
Start: 2019-12-10 | End: 2020-12-02 | Stop reason: SDUPTHER

## 2019-12-10 RX ORDER — METOPROLOL SUCCINATE 50 MG/1
50 TABLET, EXTENDED RELEASE ORAL DAILY
Qty: 90 TABLET | Refills: 3 | Status: SHIPPED | OUTPATIENT
Start: 2019-12-10 | End: 2020-07-02 | Stop reason: ALTCHOICE

## 2019-12-10 RX ORDER — CYCLOBENZAPRINE HCL 10 MG
10 TABLET ORAL NIGHTLY PRN
Qty: 90 TABLET | Refills: 0 | Status: SHIPPED | OUTPATIENT
Start: 2019-12-10 | End: 2020-01-09 | Stop reason: SDUPTHER

## 2019-12-10 RX ORDER — ETONOGESTREL AND ETHINYL ESTRADIOL VAGINAL RING .015; .12 MG/D; MG/D
RING VAGINAL
COMMUNITY
Start: 2019-12-10 | End: 2021-09-08

## 2019-12-10 RX ORDER — CYCLOBENZAPRINE HCL 10 MG
1 TABLET ORAL NIGHTLY PRN
COMMUNITY
End: 2019-12-10 | Stop reason: SDUPTHER

## 2019-12-10 NOTE — PROGRESS NOTES
SUBJECTIVE:    Patient ID: Nani Orr is a 34 y.o. female.    Chief Complaint: Regular Check Up    Patient with hypertension and PCOS presents for visit.  She has hospitalization approximately 4 months ago secondary to bradycardia and abnormal EKG.  All symptoms have resolved since decreasing her metoprolol.  Her blood pressure has remained well controlled.  Her weight is stable.  Labs performed are available in the media section of her chart and they are all acceptable.  The patient has had a Pap smear earlier this month.  She is resuming her use of oral contraception to help with her hormones and with acne.      Past Medical History:   Diagnosis Date    Goiter, non-toxic     Herpes simplex     Hypertension     PCOS (polycystic ovarian syndrome)      Past Surgical History:   Procedure Laterality Date     SECTION  2009     SECTION  2016    EUA; Excision of Anal Polyp  10/15/2018         Family History   Problem Relation Age of Onset    Hypertension Mother     Breast cancer Maternal Aunt     Hypertension Maternal Aunt     Heart disease Maternal Grandmother     Heart disease Paternal Uncle        Marital Status: Single  Alcohol History:  reports that she drinks alcohol.  Tobacco History:  reports that she has never smoked. She has never used smokeless tobacco.  Drug History:  reports that she does not use drugs.    Review of patient's allergies indicates:  No Known Allergies    Current Outpatient Medications:     cyclobenzaprine (FLEXERIL) 10 MG tablet, Take 1 tablet (10 mg total) by mouth nightly as needed., Disp: 90 tablet, Rfl: 0    etonogestrel-ethinyl estradiol (NUVARING) 0.12-0.015 mg/24 hr vaginal ring, Insert vaginally and leave in place for 3 consecutive weeks, then remove for 1 week., Disp: , Rfl:     hydroCHLOROthiazide (HYDRODIURIL) 25 MG tablet, Take 1 tablet (25 mg total) by mouth once daily., Disp: 90 tablet, Rfl: 3    metoprolol succinate  "(TOPROL-XL) 50 MG 24 hr tablet, Take 1 tablet (50 mg total) by mouth once daily., Disp: 90 tablet, Rfl: 3    Review of Systems   Constitutional: Negative for activity change, fatigue and unexpected weight change.   HENT: Negative for hearing loss, postnasal drip, sinus pressure, sore throat and voice change.    Eyes: Negative for photophobia and visual disturbance.   Respiratory: Negative for cough, shortness of breath and wheezing.    Cardiovascular: Negative for chest pain and palpitations.   Gastrointestinal: Negative for constipation, diarrhea and nausea.   Genitourinary: Negative for difficulty urinating, frequency, hematuria and urgency.   Musculoskeletal: Negative for arthralgias and back pain.   Skin: Negative for rash.   Neurological: Negative for weakness, light-headedness and headaches.   Hematological: Negative for adenopathy. Does not bruise/bleed easily.   Psychiatric/Behavioral: The patient is not nervous/anxious.           Objective:      Vitals:    12/10/19 1529   BP: 112/88   Pulse: 72   Weight: 123.4 kg (272 lb)   Height: 5' 5" (1.651 m)     Physical Exam   Constitutional: She is oriented to person, place, and time. Vital signs are normal. She appears well-developed and well-nourished. No distress.   HENT:   Head: Normocephalic and atraumatic.   Right Ear: Tympanic membrane and external ear normal.   Left Ear: Tympanic membrane and external ear normal.   Eyes: Pupils are equal, round, and reactive to light. Conjunctivae, EOM and lids are normal.   Neck: Full passive range of motion without pain. Neck supple. No JVD present. No tracheal deviation present. No thyromegaly present.   Cardiovascular: Normal rate and regular rhythm. PMI is not displaced.   Pulmonary/Chest: Effort normal and breath sounds normal.   Abdominal: Soft. Bowel sounds are normal. There is no hepatosplenomegaly. There is no tenderness. There is no rebound and no guarding.   Musculoskeletal: Normal range of motion. She exhibits " no edema or tenderness.   Neurological: She is alert and oriented to person, place, and time.   Skin: Skin is warm and dry. No rash noted.   Psychiatric: She has a normal mood and affect.   Vitals reviewed.        Assessment:       1. Essential hypertension    2. PCOS (polycystic ovarian syndrome)    3. Multinodular goiter    4. Neck pain         Plan:       Essential hypertension  -     hydroCHLOROthiazide (HYDRODIURIL) 25 MG tablet; Take 1 tablet (25 mg total) by mouth once daily.  Dispense: 90 tablet; Refill: 3  -     metoprolol succinate (TOPROL-XL) 50 MG 24 hr tablet; Take 1 tablet (50 mg total) by mouth once daily.  Dispense: 90 tablet; Refill: 3    PCOS (polycystic ovarian syndrome)    Multinodular goiter    Neck pain  -     cyclobenzaprine (FLEXERIL) 10 MG tablet; Take 1 tablet (10 mg total) by mouth nightly as needed.  Dispense: 90 tablet; Refill: 0      Follow up in about 6 months (around 6/10/2020).

## 2020-01-06 ENCOUNTER — HOSPITAL ENCOUNTER (EMERGENCY)
Facility: HOSPITAL | Age: 35
Discharge: HOME | End: 2020-01-07
Attending: EMERGENCY MEDICINE
Payer: COMMERCIAL

## 2020-01-06 DIAGNOSIS — M25.561 ACUTE PAIN OF RIGHT KNEE: Primary | ICD-10-CM

## 2020-01-06 LAB
B-HCG UR QL: NEGATIVE
CTP QC/QA: YES

## 2020-01-06 PROCEDURE — 81025 URINE PREGNANCY TEST: CPT | Performed by: EMERGENCY MEDICINE

## 2020-01-06 PROCEDURE — 99284 EMERGENCY DEPT VISIT MOD MDM: CPT | Mod: 25

## 2020-01-07 VITALS
BODY MASS INDEX: 44.98 KG/M2 | DIASTOLIC BLOOD PRESSURE: 64 MMHG | SYSTOLIC BLOOD PRESSURE: 130 MMHG | HEIGHT: 65 IN | TEMPERATURE: 99 F | HEART RATE: 70 BPM | WEIGHT: 270 LBS | OXYGEN SATURATION: 99 % | RESPIRATION RATE: 18 BRPM

## 2020-01-07 NOTE — ED PROVIDER NOTES
Encounter Date: 2020       History     Chief Complaint   Patient presents with    Knee Pain     right     34-year-old female with a history of HTN, PCOS presents to the ER with right knee pain.  Patient states that the past several days, she has had pain and swelling to her right knee.  Pain is described as sore and tightness.  Denies any trauma or inciting event.  Worse when she is weight-bearing, improves when she is resting.  Swelling improves with elevation.  Denies fever, nausea or vomiting, chest pain, shortness of breath. Denies history of DVT, PE.  Denies recent long distance travel.  She does use a Nuva ring.        Review of patient's allergies indicates:  No Known Allergies  Past Medical History:   Diagnosis Date    Goiter, non-toxic     Herpes simplex     Hypertension     PCOS (polycystic ovarian syndrome)      Past Surgical History:   Procedure Laterality Date     SECTION  2009     SECTION  2016    EUA; Excision of Anal Polyp  10/15/2018         Family History   Problem Relation Age of Onset    Hypertension Mother     Breast cancer Maternal Aunt     Hypertension Maternal Aunt     Heart disease Maternal Grandmother     Heart disease Paternal Uncle      Social History     Tobacco Use    Smoking status: Never Smoker    Smokeless tobacco: Never Used   Substance Use Topics    Alcohol use: Yes     Comment: Socially     Drug use: No     Review of Systems   Constitutional: Negative for fever.   HENT: Negative for sore throat.    Respiratory: Negative for shortness of breath.    Cardiovascular: Negative for chest pain.   Gastrointestinal: Negative for abdominal pain, nausea and vomiting.   Genitourinary: Negative for dysuria.   Musculoskeletal: Positive for arthralgias. Negative for back pain.   Skin: Negative for rash.   Neurological: Negative for weakness.   Hematological: Does not bruise/bleed easily.   All other systems reviewed and are  negative.      Physical Exam     Initial Vitals [01/06/20 2122]   BP Pulse Resp Temp SpO2   (!) 145/84 76 20 98.2 °F (36.8 °C) 100 %      MAP       --         Physical Exam    Constitutional: She appears well-developed and well-nourished. No distress.   HENT:   Head: Normocephalic and atraumatic.   Mouth/Throat: Oropharynx is clear and moist.   Eyes: Conjunctivae and EOM are normal. Pupils are equal, round, and reactive to light.   Neck: Normal range of motion.   Cardiovascular: Normal rate, regular rhythm, normal heart sounds and intact distal pulses.   No murmur heard.  Pulmonary/Chest: Breath sounds normal. No respiratory distress. She has no wheezes.   Abdominal: She exhibits no distension.   Musculoskeletal: Normal range of motion. She exhibits no edema or tenderness.   Right knee with mild swelling, tenderness to palpation of anterior medial aspect and posterior aspect.  No warmth, induration, effusion.  Full range of motion. Neurovascularly intact distally.   Neurological: She is alert.   Skin: Skin is warm and dry.   Psychiatric: She has a normal mood and affect. Thought content normal.         ED Course   Procedures  Labs Reviewed   POCT URINE PREGNANCY          Imaging Results          X-Ray Knee Complete 4 or more Views Right (In process)    Procedure changed from X-Ray Knee 1 or 2 View Right                  Medical Decision Making:   Initial Assessment:   34-year-old female with a history of HTN, PCOS presents to the ER with right knee pain.  ED Management:  Plan:  Afebrile, vital signs stable.  Exam consistent with musculoskeletal pain as patient is on her feet a lot for her job.  X-ray shows no evidence of fracture or dislocation.  Very low suspicion for infection such as cellulitis or septic joint.  She did have pain posteriorly, and she is on the Nuva ring.  Discussed the possibility of thrombus, although I feel like this is very unlikely.  Offered ultrasound of the lower extremity, but patient  declines.  Will apply Ace bandage.  Recommend RICE therapy. F/u with PCP as needed.  Given return precautions.  Patient understands the plan.                                 Clinical Impression:       ICD-10-CM ICD-9-CM   1. Acute pain of right knee M25.561 719.46                             Roly King MD  01/07/20 0011

## 2020-01-09 ENCOUNTER — PATIENT MESSAGE (OUTPATIENT)
Dept: FAMILY MEDICINE | Facility: CLINIC | Age: 35
End: 2020-01-09

## 2020-01-09 DIAGNOSIS — M54.2 NECK PAIN: ICD-10-CM

## 2020-01-10 RX ORDER — CYCLOBENZAPRINE HCL 10 MG
10 TABLET ORAL NIGHTLY PRN
Qty: 90 TABLET | Refills: 0 | Status: SHIPPED | OUTPATIENT
Start: 2020-01-10 | End: 2020-07-02 | Stop reason: SDUPTHER

## 2020-01-14 ENCOUNTER — OFFICE VISIT (OUTPATIENT)
Dept: ORTHOPEDICS | Facility: CLINIC | Age: 35
End: 2020-01-14
Payer: COMMERCIAL

## 2020-01-14 VITALS
SYSTOLIC BLOOD PRESSURE: 168 MMHG | HEIGHT: 65 IN | HEART RATE: 64 BPM | WEIGHT: 270 LBS | BODY MASS INDEX: 44.98 KG/M2 | DIASTOLIC BLOOD PRESSURE: 94 MMHG

## 2020-01-14 DIAGNOSIS — M25.461 PAIN AND SWELLING OF RIGHT KNEE: Primary | ICD-10-CM

## 2020-01-14 DIAGNOSIS — M25.561 PAIN AND SWELLING OF RIGHT KNEE: Primary | ICD-10-CM

## 2020-01-14 PROCEDURE — 20610 DRAIN/INJ JOINT/BURSA W/O US: CPT | Mod: RT,S$GLB,, | Performed by: ORTHOPAEDIC SURGERY

## 2020-01-14 PROCEDURE — 99999 PR PBB SHADOW E&M-EST. PATIENT-LVL III: CPT | Mod: PBBFAC,,, | Performed by: ORTHOPAEDIC SURGERY

## 2020-01-14 PROCEDURE — 3077F SYST BP >= 140 MM HG: CPT | Mod: CPTII,S$GLB,, | Performed by: ORTHOPAEDIC SURGERY

## 2020-01-14 PROCEDURE — 99204 OFFICE O/P NEW MOD 45 MIN: CPT | Mod: 25,S$GLB,, | Performed by: ORTHOPAEDIC SURGERY

## 2020-01-14 PROCEDURE — 3077F PR MOST RECENT SYSTOLIC BLOOD PRESSURE >= 140 MM HG: ICD-10-PCS | Mod: CPTII,S$GLB,, | Performed by: ORTHOPAEDIC SURGERY

## 2020-01-14 PROCEDURE — 3080F DIAST BP >= 90 MM HG: CPT | Mod: CPTII,S$GLB,, | Performed by: ORTHOPAEDIC SURGERY

## 2020-01-14 PROCEDURE — 3080F PR MOST RECENT DIASTOLIC BLOOD PRESSURE >= 90 MM HG: ICD-10-PCS | Mod: CPTII,S$GLB,, | Performed by: ORTHOPAEDIC SURGERY

## 2020-01-14 PROCEDURE — 99999 PR PBB SHADOW E&M-EST. PATIENT-LVL III: ICD-10-PCS | Mod: PBBFAC,,, | Performed by: ORTHOPAEDIC SURGERY

## 2020-01-14 PROCEDURE — 20610 LARGE JOINT ASPIRATION/INJECTION: R KNEE: ICD-10-PCS | Mod: RT,S$GLB,, | Performed by: ORTHOPAEDIC SURGERY

## 2020-01-14 PROCEDURE — 99204 PR OFFICE/OUTPT VISIT, NEW, LEVL IV, 45-59 MIN: ICD-10-PCS | Mod: 25,S$GLB,, | Performed by: ORTHOPAEDIC SURGERY

## 2020-01-14 PROCEDURE — 3008F BODY MASS INDEX DOCD: CPT | Mod: CPTII,S$GLB,, | Performed by: ORTHOPAEDIC SURGERY

## 2020-01-14 PROCEDURE — 3008F PR BODY MASS INDEX (BMI) DOCUMENTED: ICD-10-PCS | Mod: CPTII,S$GLB,, | Performed by: ORTHOPAEDIC SURGERY

## 2020-01-14 RX ORDER — METHYLPREDNISOLONE ACETATE 40 MG/ML
40 INJECTION, SUSPENSION INTRA-ARTICULAR; INTRALESIONAL; INTRAMUSCULAR; SOFT TISSUE
Status: DISCONTINUED | OUTPATIENT
Start: 2020-01-14 | End: 2020-01-14 | Stop reason: HOSPADM

## 2020-01-14 RX ADMIN — METHYLPREDNISOLONE ACETATE 40 MG: 40 INJECTION, SUSPENSION INTRA-ARTICULAR; INTRALESIONAL; INTRAMUSCULAR; SOFT TISSUE at 08:01

## 2020-01-14 NOTE — PROGRESS NOTES
CC:  34-year-old female presents for evaluation of right knee pain. She was seen in the emergency room at [facility] on 01/06/2020 for complaints of pain and swelling in the right knee. Apparently she was offered an ultrasound of her leg but refused during the ER visit.  They applied an Ace bandage recommended RICE therapy.  The patient states she has been doing that, using wraps, ice, elevation and taking NSAIDs.  She states that NSAIDs give her some but not complete relief.  She rates her pain currently as a 5/10.    ROS:    Constitution: Denies chills, fever, and sweats.  HENT: Denies headaches or blurry vision.  Cardiovascular: Denies chest pain or irregular heart beat.  Respiratory: Denies cough or shortness of breath.  Gastrointestinal: Denies abdominal pain, nausea, or vomiting.  Genitourinary:  Denies urinary incontinence, bladder and kidney issues  Musculoskeletal:  Denies muscle cramps.  Positive for right knee pain  Neurological: Denies dizziness or focal weakness.  Psychiatric/Behavioral: Normal mental status.  Hematologic/Lymphatic: Denies bleeding problem or easy bruising/bleeding.  Skin: Denies rash or suspicious lesions.    Physical examination     Gen - No acute distress   Eyes - Extraoccular motions intact, pupils equally round and reactive to light and accommodation   ENT - normocephalic, atruamtic, oropharynx clear   Neck - Supple, no abnormal masses   Cardiovascular - regular rate and rhythm   Pulmonary - clear to auscultation bilaterally   Abdomen - soft, non-tender, non-distended, positive bowel sounds   Psych - The patient is alert and oriented x3 with normal mood and affect    Examination of the Right Lower Extremity:     Motor function is intact distally EHL/FHL/TA/char   +2 dorsalis pedis and posterior tibial pulses   Sensation to light touch intact distally dorsal, plantar, and first web space     Examination of the Right knee:    ROM 0 - 150   Effusion negative  Tenderness  to palpation at the joint line positive  Pain during range of motion positive  Crepitation during range of motion negative     positive increased pain noted with flexion past 90   positive antalgic gait noted   negative Lachman's Test   negative Anterior Drawer Test   negative Posterior Drawer Test   positive McMurrays Test   positive Disco Test   negative Varus/Valgus instability    X-rays were examined and personally reviewed by me.  Four views of the right knee dated 01/06/2020 are available for review.  Joint space is well maintained.  There are no acute fractures. No signs of advanced osteoarthritis.  Normal exam.    Dx:  Synovitis of the right knee    Plan:  Recommendations for intra-articular steroid injection.  The patient agreed.  We injected the right knee with a mixture of 2, 2, 1.  She tolerated well.  Follow up in 1 week for re-evaluation.    Answers for HPI/ROS submitted by the patient on 1/13/2020   Leg pain  unexpected weight change: No  appetite change : No  sleep disturbance: No  IMMUNOCOMPROMISED: No  nervous/ anxious: No  dysphoric mood: No  rash: No  visual disturbance: No  eye redness: No  eye pain: No  ear pain: No  tinnitus: No  hearing loss: No  sinus pressure : No  nosebleeds: No  enviro allergies: No  food allergies: No  cough: No  shortness of breath: No  sweating: No  dysuria: No  frequency: No  difficulty urinating: No  hematuria: No  painful intercourse: No  chest pain: No  palpitations: No  nausea: No  vomiting: No  diarrhea: No  blood in stool: No  constipation: No  headaches: No  dizziness: No  numbness: No  seizures: No  joint swelling: Yes  myalgia: Yes  weakness: Yes  back pain: Yes  Pain Chronicity: new  History of trauma: No  Onset: 1 to 4 weeks ago  Frequency: constantly  Progression since onset: unchanged  injury location: at home  pain- numeric: 7/10  pain location: right knee  pain quality: dull, tightness, tight  Radiating Pain: No  Aggravating factors: bending, bearing  weight, standing, walking  fever: No  inability to bear weight: Yes  itching: No  joint locking: No  limited range of motion: Yes  stiffness: Yes  tingling: No  Treatments tried: cold, heat, movement, OTC pain meds, rest  physical therapy: not tried  Improvement on treatment: no relief

## 2020-01-14 NOTE — PROCEDURES
Large Joint Aspiration/Injection: R knee  Date/Time: 1/14/2020 8:45 AM  Performed by: Sebastian Theodore II, MD  Authorized by: Sebastian Theodore II, MD     Consent Done?:  Yes (Verbal)  Indications:  Pain  Timeout: Prior to procedure the correct patient, procedure, and site was verified    Anesthesia  Local anesthesia used  Anesthetic: topical anesthetic    Location:  Knee  Site:  R knee  Prep: Patient was prepped and draped in usual sterile fashion    Needle size:  22 G  Approach:  Anteromedial  Medications:  40 mg methylPREDNISolone acetate 40 mg/mL  Patient tolerance:  Patient tolerated the procedure well with no immediate complications

## 2020-01-20 ENCOUNTER — OFFICE VISIT (OUTPATIENT)
Dept: ORTHOPEDICS | Facility: CLINIC | Age: 35
End: 2020-01-20
Payer: COMMERCIAL

## 2020-01-20 VITALS
DIASTOLIC BLOOD PRESSURE: 89 MMHG | HEART RATE: 66 BPM | HEIGHT: 65 IN | BODY MASS INDEX: 44.98 KG/M2 | SYSTOLIC BLOOD PRESSURE: 166 MMHG | WEIGHT: 270 LBS

## 2020-01-20 DIAGNOSIS — M25.561 RIGHT KNEE PAIN, UNSPECIFIED CHRONICITY: Primary | ICD-10-CM

## 2020-01-20 PROCEDURE — 3079F PR MOST RECENT DIASTOLIC BLOOD PRESSURE 80-89 MM HG: ICD-10-PCS | Mod: CPTII,S$GLB,, | Performed by: ORTHOPAEDIC SURGERY

## 2020-01-20 PROCEDURE — 3079F DIAST BP 80-89 MM HG: CPT | Mod: CPTII,S$GLB,, | Performed by: ORTHOPAEDIC SURGERY

## 2020-01-20 PROCEDURE — 99212 OFFICE O/P EST SF 10 MIN: CPT | Mod: S$GLB,,, | Performed by: ORTHOPAEDIC SURGERY

## 2020-01-20 PROCEDURE — 99999 PR PBB SHADOW E&M-EST. PATIENT-LVL III: ICD-10-PCS | Mod: PBBFAC,,, | Performed by: ORTHOPAEDIC SURGERY

## 2020-01-20 PROCEDURE — 3008F BODY MASS INDEX DOCD: CPT | Mod: CPTII,S$GLB,, | Performed by: ORTHOPAEDIC SURGERY

## 2020-01-20 PROCEDURE — 3077F PR MOST RECENT SYSTOLIC BLOOD PRESSURE >= 140 MM HG: ICD-10-PCS | Mod: CPTII,S$GLB,, | Performed by: ORTHOPAEDIC SURGERY

## 2020-01-20 PROCEDURE — 3077F SYST BP >= 140 MM HG: CPT | Mod: CPTII,S$GLB,, | Performed by: ORTHOPAEDIC SURGERY

## 2020-01-20 PROCEDURE — 99999 PR PBB SHADOW E&M-EST. PATIENT-LVL III: CPT | Mod: PBBFAC,,, | Performed by: ORTHOPAEDIC SURGERY

## 2020-01-20 PROCEDURE — 99212 PR OFFICE/OUTPT VISIT, EST, LEVL II, 10-19 MIN: ICD-10-PCS | Mod: S$GLB,,, | Performed by: ORTHOPAEDIC SURGERY

## 2020-01-20 PROCEDURE — 3008F PR BODY MASS INDEX (BMI) DOCUMENTED: ICD-10-PCS | Mod: CPTII,S$GLB,, | Performed by: ORTHOPAEDIC SURGERY

## 2020-01-20 NOTE — PROGRESS NOTES
CC:  34-year-old female follows up with pain and swelling of the right knee.  She has previously been diagnosed with synovitis.  We injected her knee on 01/14/2020.  She states she got relief for about 2 days but then the pain returned.  She reports continued pain in the right knee.  She rates the pain as an 8/10.  She also reports that the knee pops and occasionally yung and gives way.    Examination of the Right Lower Extremity:     Motor function is intact distally EHL/FHL/TA/char   +2 dorsalis pedis and posterior tibial pulses   Sensation to light touch intact distally dorsal, plantar, and first web space     Examination of the Right knee:    ROM 0 - 150   Effusion positive  Tenderness to palpation at the joint line positive  Pain during range of motion positive  Crepitation during range of motion negative     positive increased pain noted with flexion past 90   positive antalgic gait noted   negative Lachman's Test   negative Anterior Drawer Test   negative Posterior Drawer Test   positive McMurrays Test   positive Disco Test   negative Varus/Valgus instability    Dx:  Pain and mechanical symptoms right knee that has failed steroid injections, ice, activity modification, and NSAIDs.    Plan:  Recommendations for MRI of the right knee.  Follow-up after MRI is complete.    Answers for HPI/ROS submitted by the patient on 1/19/2020   Leg pain  unexpected weight change: No  appetite change : No  sleep disturbance: No  IMMUNOCOMPROMISED: No  nervous/ anxious: No  dysphoric mood: No  rash: No  visual disturbance: No  eye redness: No  eye pain: No  ear pain: No  tinnitus: No  hearing loss: No  sinus pressure : No  nosebleeds: No  enviro allergies: No  food allergies: No  cough: No  shortness of breath: No  sweating: No  dysuria: No  frequency: No  difficulty urinating: No  hematuria: No  painful intercourse: No  chest pain: No  palpitations: No  nausea: No  vomiting: No  diarrhea: No  blood in stool:  No  constipation: No  headaches: No  dizziness: No  numbness: No  seizures: No  joint swelling: Yes  myalgia: Yes  weakness: Yes  back pain: No  Pain Chronicity: recurrent  History of trauma: No  Onset: today  Frequency: constantly  Progression since onset: unchanged  injury location: at home  pain- numeric: 8/10  pain location: right knee  pain quality: aching, tightness  Radiating Pain: No  Aggravating factors: activity, bending, bearing weight, standing, flexion, twisting, walking, sitting  fever: No  inability to bear weight: No  itching: No  joint locking: No  limited range of motion: Yes  stiffness: Yes  tingling: No  Treatments tried: cold, heat, injection treatment, OTC pain meds  physical therapy: not tried  Improvement on treatment: mild

## 2020-01-22 ENCOUNTER — HOSPITAL ENCOUNTER (OUTPATIENT)
Dept: RADIOLOGY | Facility: HOSPITAL | Age: 35
Discharge: HOME | End: 2020-01-22
Attending: ORTHOPAEDIC SURGERY
Payer: COMMERCIAL

## 2020-01-22 DIAGNOSIS — M25.561 RIGHT KNEE PAIN, UNSPECIFIED CHRONICITY: ICD-10-CM

## 2020-01-22 PROCEDURE — 73721 MRI KNEE WITHOUT CONTRAST RIGHT: ICD-10-PCS | Mod: 26,RT,, | Performed by: RADIOLOGY

## 2020-01-22 PROCEDURE — 73721 MRI JNT OF LWR EXTRE W/O DYE: CPT | Mod: 26,RT,, | Performed by: RADIOLOGY

## 2020-01-22 PROCEDURE — 73721 MRI JNT OF LWR EXTRE W/O DYE: CPT | Mod: TC,RT

## 2020-01-22 NOTE — PROGRESS NOTES
CC:  34-year-old female follows up for MRI results of her right knee. The patient has history of pain and swelling in the right knee. She has tried compressive wraps, ice, elevation, activity modification, and NSAIDs in addition to intra-articular steroid injections all without relief.  She continues to have pain in the right knee but she does report that is slowly getting better.    Examination of the Right Lower Extremity:     Motor function is intact distally EHL/FHL/TA/char   +2 dorsalis pedis and posterior tibial pulses   Sensation to light touch intact distally dorsal, plantar, and first web space     Examination of the Right knee:    ROM 0 - 150   Effusion positive  Tenderness to palpation at the joint line negative  Pain during range of motion negative  Crepitation during range of motion negative     positive increased pain noted with flexion past 90   negative antalgic gait noted   negative Lachman's Test   negative Anterior Drawer Test   negative Posterior Drawer Test   negative McMurrays Test   negative Disco Test   negative Varus/Valgus instability    MRI images were examined and personally reviewed by me.  There is a large effusion of the right knee indicative of irritation of the synovium with excessive so we fluid production.  Otherwise normal exam with no meniscal tears, no arthritis, no cartilage damage.    Dx:  Synovitis right knee    Plan:  I went over the MRI results with the patient and showed her the images.  At this point I would recommend Motrin p.r.n., ice, activity modification and elevation.  Patient verbalized understanding.  She can follow up p.r.n..    Answers for HPI/ROS submitted by the patient on 1/21/2020   Leg pain  unexpected weight change: No  appetite change : No  sleep disturbance: No  IMMUNOCOMPROMISED: No  nervous/ anxious: No  dysphoric mood: No  rash: No  visual disturbance: No  eye redness: No  eye pain: No  ear pain: No  tinnitus: No  hearing loss: No  sinus pressure :  No  nosebleeds: No  enviro allergies: No  food allergies: No  cough: No  shortness of breath: No  sweating: No  dysuria: No  frequency: No  difficulty urinating: No  hematuria: No  painful intercourse: No  chest pain: No  palpitations: No  nausea: No  vomiting: No  diarrhea: No  blood in stool: No  constipation: No  headaches: No  dizziness: No  numbness: No  seizures: No  joint swelling: Yes  myalgia: Yes  weakness: Yes  back pain: No  Pain Chronicity: chronic  History of trauma: No  Onset: 1 to 4 weeks ago  Frequency: constantly  Progression since onset: waxing and waning  injury location: at home  pain- numeric: 8/10  pain location: right knee  pain quality: aching, dull, squeezing, tightness  Radiating Pain: No  Aggravating factors: activity, bending, bearing weight, standing, twisting, walking  fever: No  inability to bear weight: Yes  itching: No  joint locking: No  limited range of motion: Yes  stiffness: Yes  tingling: No  Treatments tried: injection treatment  physical therapy: not tried  Improvement on treatment: mild

## 2020-01-23 ENCOUNTER — OFFICE VISIT (OUTPATIENT)
Dept: ORTHOPEDICS | Facility: CLINIC | Age: 35
End: 2020-01-23
Payer: COMMERCIAL

## 2020-01-23 VITALS
HEART RATE: 65 BPM | SYSTOLIC BLOOD PRESSURE: 160 MMHG | HEIGHT: 65 IN | WEIGHT: 270 LBS | DIASTOLIC BLOOD PRESSURE: 91 MMHG | BODY MASS INDEX: 44.98 KG/M2

## 2020-01-23 DIAGNOSIS — M25.461 PAIN AND SWELLING OF RIGHT KNEE: Primary | ICD-10-CM

## 2020-01-23 DIAGNOSIS — M25.561 PAIN AND SWELLING OF RIGHT KNEE: Primary | ICD-10-CM

## 2020-01-23 PROCEDURE — 3008F PR BODY MASS INDEX (BMI) DOCUMENTED: ICD-10-PCS | Mod: CPTII,S$GLB,, | Performed by: ORTHOPAEDIC SURGERY

## 2020-01-23 PROCEDURE — 3077F SYST BP >= 140 MM HG: CPT | Mod: CPTII,S$GLB,, | Performed by: ORTHOPAEDIC SURGERY

## 2020-01-23 PROCEDURE — 99213 PR OFFICE/OUTPT VISIT, EST, LEVL III, 20-29 MIN: ICD-10-PCS | Mod: S$GLB,,, | Performed by: ORTHOPAEDIC SURGERY

## 2020-01-23 PROCEDURE — 99999 PR PBB SHADOW E&M-EST. PATIENT-LVL III: CPT | Mod: PBBFAC,,, | Performed by: ORTHOPAEDIC SURGERY

## 2020-01-23 PROCEDURE — 3080F DIAST BP >= 90 MM HG: CPT | Mod: CPTII,S$GLB,, | Performed by: ORTHOPAEDIC SURGERY

## 2020-01-23 PROCEDURE — 3008F BODY MASS INDEX DOCD: CPT | Mod: CPTII,S$GLB,, | Performed by: ORTHOPAEDIC SURGERY

## 2020-01-23 PROCEDURE — 99999 PR PBB SHADOW E&M-EST. PATIENT-LVL III: ICD-10-PCS | Mod: PBBFAC,,, | Performed by: ORTHOPAEDIC SURGERY

## 2020-01-23 PROCEDURE — 99213 OFFICE O/P EST LOW 20 MIN: CPT | Mod: S$GLB,,, | Performed by: ORTHOPAEDIC SURGERY

## 2020-01-23 PROCEDURE — 3080F PR MOST RECENT DIASTOLIC BLOOD PRESSURE >= 90 MM HG: ICD-10-PCS | Mod: CPTII,S$GLB,, | Performed by: ORTHOPAEDIC SURGERY

## 2020-01-23 PROCEDURE — 3077F PR MOST RECENT SYSTOLIC BLOOD PRESSURE >= 140 MM HG: ICD-10-PCS | Mod: CPTII,S$GLB,, | Performed by: ORTHOPAEDIC SURGERY

## 2020-04-17 ENCOUNTER — PATIENT MESSAGE (OUTPATIENT)
Dept: FAMILY MEDICINE | Facility: CLINIC | Age: 35
End: 2020-04-17

## 2020-04-20 RX ORDER — VALACYCLOVIR HYDROCHLORIDE 1 G/1
1000 TABLET, FILM COATED ORAL 2 TIMES DAILY
Qty: 60 TABLET | Refills: 2 | Status: SHIPPED | OUTPATIENT
Start: 2020-04-20 | End: 2022-05-04

## 2020-06-22 ENCOUNTER — TELEPHONE (OUTPATIENT)
Dept: FAMILY MEDICINE | Facility: CLINIC | Age: 35
End: 2020-06-22

## 2020-06-22 NOTE — TELEPHONE ENCOUNTER
----- Message from Gloria Marlow sent at 6/22/2020 10:35 AM CDT -----  Regarding: Needs Call back from nurse  Contact: Nani Kwong  Pt says that she has a headache and a little bit stuffy and her job told her to go home. Now she needs a letter note to come back to work. She would like to know could she get the note faxed or emailed to her or could she move her appt date up or can she just go to Urgent care? Please give the nurse a call back .   Pt# [phone]

## 2020-06-22 NOTE — TELEPHONE ENCOUNTER
Spoke with patient, will need note today.  Recommend urgent care per dr yen.  Patient rescheduled upcoming visit also -DN

## 2020-07-02 ENCOUNTER — OFFICE VISIT (OUTPATIENT)
Dept: FAMILY MEDICINE | Facility: CLINIC | Age: 35
End: 2020-07-02
Payer: COMMERCIAL

## 2020-07-02 VITALS
DIASTOLIC BLOOD PRESSURE: 98 MMHG | SYSTOLIC BLOOD PRESSURE: 132 MMHG | HEIGHT: 65 IN | HEART RATE: 72 BPM | BODY MASS INDEX: 42.82 KG/M2 | WEIGHT: 257 LBS | TEMPERATURE: 98 F

## 2020-07-02 DIAGNOSIS — I10 ESSENTIAL HYPERTENSION: Primary | ICD-10-CM

## 2020-07-02 DIAGNOSIS — E04.2 MULTINODULAR GOITER: ICD-10-CM

## 2020-07-02 DIAGNOSIS — Z13.6 SCREENING FOR ISCHEMIC HEART DISEASE (IHD): ICD-10-CM

## 2020-07-02 DIAGNOSIS — M54.2 NECK PAIN: ICD-10-CM

## 2020-07-02 PROBLEM — R07.9 CHEST PAIN: Status: RESOLVED | Noted: 2019-08-17 | Resolved: 2020-07-02

## 2020-07-02 PROBLEM — I45.10 RIGHT BUNDLE BRANCH BLOCK: Status: RESOLVED | Noted: 2019-08-18 | Resolved: 2020-07-02

## 2020-07-02 PROCEDURE — 3080F PR MOST RECENT DIASTOLIC BLOOD PRESSURE >= 90 MM HG: ICD-10-PCS | Mod: S$GLB,,, | Performed by: FAMILY MEDICINE

## 2020-07-02 PROCEDURE — 3008F PR BODY MASS INDEX (BMI) DOCUMENTED: ICD-10-PCS | Mod: S$GLB,,, | Performed by: FAMILY MEDICINE

## 2020-07-02 PROCEDURE — 3008F BODY MASS INDEX DOCD: CPT | Mod: S$GLB,,, | Performed by: FAMILY MEDICINE

## 2020-07-02 PROCEDURE — 3075F PR MOST RECENT SYSTOLIC BLOOD PRESS GE 130-139MM HG: ICD-10-PCS | Mod: S$GLB,,, | Performed by: FAMILY MEDICINE

## 2020-07-02 PROCEDURE — 99214 OFFICE O/P EST MOD 30 MIN: CPT | Mod: S$GLB,,, | Performed by: FAMILY MEDICINE

## 2020-07-02 PROCEDURE — 3075F SYST BP GE 130 - 139MM HG: CPT | Mod: S$GLB,,, | Performed by: FAMILY MEDICINE

## 2020-07-02 PROCEDURE — 3080F DIAST BP >= 90 MM HG: CPT | Mod: S$GLB,,, | Performed by: FAMILY MEDICINE

## 2020-07-02 PROCEDURE — 99214 PR OFFICE/OUTPT VISIT, EST, LEVL IV, 30-39 MIN: ICD-10-PCS | Mod: S$GLB,,, | Performed by: FAMILY MEDICINE

## 2020-07-02 RX ORDER — CYCLOBENZAPRINE HCL 10 MG
10 TABLET ORAL NIGHTLY PRN
Qty: 90 TABLET | Refills: 1 | Status: SHIPPED | OUTPATIENT
Start: 2020-07-02 | End: 2021-05-05

## 2020-07-02 RX ORDER — OLMESARTAN MEDOXOMIL 20 MG/1
20 TABLET ORAL DAILY
Qty: 90 TABLET | Refills: 3 | Status: SHIPPED | OUTPATIENT
Start: 2020-07-02 | End: 2021-09-08

## 2020-07-02 NOTE — PROGRESS NOTES
SUBJECTIVE:    Patient ID: Nani Orr is a 34 y.o. female.    Chief Complaint: Regular Check Up and discuss elevated bloodpressure    Patient hypertension presents for visit.  She has been monitoring her diet and there is positive weight loss of over 15 lb since her visit.  However there is some concern still for some elevated blood pressures despite medication compliance. High dose metropol caused bradycardia. Regular visit with gyn      Past Medical History:   Diagnosis Date    Goiter, non-toxic     Herpes simplex     Hypertension     PCOS (polycystic ovarian syndrome)     Right bundle branch block 2019     Past Surgical History:   Procedure Laterality Date     SECTION  2009     SECTION  2016    EUA; Excision of Anal Polyp  10/15/2018         Family History   Problem Relation Age of Onset    Hypertension Mother     Breast cancer Maternal Aunt     Hypertension Maternal Aunt     Heart disease Maternal Grandmother     Heart disease Paternal Uncle        Marital Status: Single  Alcohol History:  reports current alcohol use.  Tobacco History:  reports that she has never smoked. She has never used smokeless tobacco.  Drug History:  reports no history of drug use.    Review of patient's allergies indicates:  No Known Allergies    Current Outpatient Medications:     cyclobenzaprine (FLEXERIL) 10 MG tablet, Take 1 tablet (10 mg total) by mouth nightly as needed., Disp: 90 tablet, Rfl: 1    etonogestrel-ethinyl estradiol (NUVARING) 0.12-0.015 mg/24 hr vaginal ring, Insert vaginally and leave in place for 3 consecutive weeks, then remove for 1 week., Disp: , Rfl:     hydroCHLOROthiazide (HYDRODIURIL) 25 MG tablet, Take 1 tablet (25 mg total) by mouth once daily., Disp: 90 tablet, Rfl: 3    valACYclovir (VALTREX) 1000 MG tablet, Take 1 tablet (1,000 mg total) by mouth 2 (two) times daily., Disp: 60 tablet, Rfl: 2    olmesartan (BENICAR) 20 MG tablet, Take 1  "tablet (20 mg total) by mouth once daily., Disp: 90 tablet, Rfl: 3    Review of Systems   Constitutional: Negative for activity change, fatigue and unexpected weight change.   HENT: Negative for hearing loss, postnasal drip, sinus pressure, sore throat and voice change.    Eyes: Negative for photophobia and visual disturbance.   Respiratory: Negative for cough, shortness of breath and wheezing.    Cardiovascular: Negative for chest pain and palpitations.   Gastrointestinal: Negative for constipation, diarrhea and nausea.   Genitourinary: Negative for difficulty urinating, frequency, hematuria and urgency.   Musculoskeletal: Negative for arthralgias and back pain.   Skin: Negative for rash.   Neurological: Negative for weakness, light-headedness and headaches.   Hematological: Negative for adenopathy. Does not bruise/bleed easily.   Psychiatric/Behavioral: The patient is not nervous/anxious.           Objective:      Vitals:    07/02/20 0858   BP: (!) 132/98   Pulse: 72   Temp: 98.2 °F (36.8 °C)   Weight: 116.6 kg (257 lb)   Height: 5' 5" (1.651 m)     Physical Exam  Vitals signs reviewed.   Constitutional:       General: She is not in acute distress.     Appearance: Normal appearance. She is well-developed. She is obese.   HENT:      Head: Normocephalic and atraumatic.      Right Ear: External ear normal.      Left Ear: External ear normal.      Nose: Nose normal.      Mouth/Throat:      Mouth: Mucous membranes are moist.   Eyes:      General: Lids are normal.      Conjunctiva/sclera: Conjunctivae normal.      Pupils: Pupils are equal, round, and reactive to light.   Neck:      Musculoskeletal: Full passive range of motion without pain and neck supple.      Thyroid: No thyromegaly.      Vascular: No JVD.      Trachea: No tracheal deviation.   Cardiovascular:      Rate and Rhythm: Normal rate and regular rhythm.      Chest Wall: PMI is not displaced.      Pulses: Normal pulses.      Heart sounds: Normal heart sounds. "   Pulmonary:      Effort: Pulmonary effort is normal.      Breath sounds: Normal breath sounds.   Abdominal:      General: Bowel sounds are normal.      Palpations: Abdomen is soft.      Tenderness: There is no abdominal tenderness. There is no guarding or rebound.   Musculoskeletal: Normal range of motion.         General: No tenderness.   Skin:     General: Skin is warm and dry.      Findings: No rash.   Neurological:      General: No focal deficit present.      Mental Status: She is alert and oriented to person, place, and time.   Psychiatric:         Mood and Affect: Mood normal.         Behavior: Behavior normal.           Assessment:       1. Essential hypertension    2. Neck pain    3. Screening for ischemic heart disease (IHD)    4. Multinodular goiter         Plan:       Essential hypertension  -     olmesartan (BENICAR) 20 MG tablet; Take 1 tablet (20 mg total) by mouth once daily.  Dispense: 90 tablet; Refill: 3  -     Comprehensive metabolic panel; Future; Expected date: 07/02/2020    Neck pain  -     cyclobenzaprine (FLEXERIL) 10 MG tablet; Take 1 tablet (10 mg total) by mouth nightly as needed.  Dispense: 90 tablet; Refill: 1    Screening for ischemic heart disease (IHD)  -     Lipid Panel; Future; Expected date: 07/02/2020    Multinodular goiter  -     TSH w/reflex to FT4; Future; Expected date: 07/02/2020      Follow up in about 6 months (around 1/2/2021) for BP.

## 2020-07-03 LAB
ALBUMIN SERPL-MCNC: 4.4 G/DL (ref 3.6–5.1)
ALBUMIN/GLOB SERPL: 1.7 (CALC) (ref 1–2.5)
ALP SERPL-CCNC: 53 U/L (ref 31–125)
ALT SERPL-CCNC: 20 U/L (ref 6–29)
AST SERPL-CCNC: 18 U/L (ref 10–30)
BILIRUB SERPL-MCNC: 0.6 MG/DL (ref 0.2–1.2)
BUN SERPL-MCNC: 15 MG/DL (ref 7–25)
BUN/CREAT SERPL: ABNORMAL (CALC) (ref 6–22)
CALCIUM SERPL-MCNC: 9.7 MG/DL (ref 8.6–10.2)
CHLORIDE SERPL-SCNC: 101 MMOL/L (ref 98–110)
CHOLEST SERPL-MCNC: 190 MG/DL
CHOLEST/HDLC SERPL: 4.6 (CALC)
CO2 SERPL-SCNC: 30 MMOL/L (ref 20–32)
CREAT SERPL-MCNC: 0.76 MG/DL (ref 0.5–1.1)
GFRSERPLBLD MDRD-ARVRAT: 103 ML/MIN/1.73M2
GLOBULIN SER CALC-MCNC: 2.6 G/DL (CALC) (ref 1.9–3.7)
GLUCOSE SERPL-MCNC: 95 MG/DL (ref 65–99)
HDLC SERPL-MCNC: 41 MG/DL
LDLC SERPL CALC-MCNC: 124 MG/DL (CALC)
NONHDLC SERPL-MCNC: 149 MG/DL (CALC)
POTASSIUM SERPL-SCNC: 2.9 MMOL/L (ref 3.5–5.3)
PROT SERPL-MCNC: 7 G/DL (ref 6.1–8.1)
SODIUM SERPL-SCNC: 140 MMOL/L (ref 135–146)
TRIGL SERPL-MCNC: 133 MG/DL
TSH SERPL-ACNC: 0.44 MIU/L

## 2020-07-07 ENCOUNTER — TELEPHONE (OUTPATIENT)
Dept: FAMILY MEDICINE | Facility: CLINIC | Age: 35
End: 2020-07-07

## 2020-07-07 DIAGNOSIS — E87.6 LOW BLOOD POTASSIUM: Primary | ICD-10-CM

## 2020-07-07 RX ORDER — POTASSIUM CHLORIDE 20 MEQ/1
20 TABLET, EXTENDED RELEASE ORAL 2 TIMES DAILY
Qty: 10 TABLET | Refills: 0 | Status: SHIPPED | OUTPATIENT
Start: 2020-07-07 | End: 2020-07-12

## 2020-07-07 NOTE — PROGRESS NOTES
Please call the patient regarding her abnormal result. Inform her thather potassium is very low and we alexis to correct this too prevent muscle cramps and abnormal heart rhythms. Potassium called in to her pharmacy for her to take for a few days and I want to repeat her value in 2 weeks. nonfasting Lab orders are available at [facility]. Cholesterol is a little high so continue to watch her diet. Bloodwork ok other wise

## 2020-07-08 ENCOUNTER — TELEPHONE (OUTPATIENT)
Dept: FAMILY MEDICINE | Facility: CLINIC | Age: 35
End: 2020-07-08

## 2020-07-08 NOTE — TELEPHONE ENCOUNTER
----- Message from Patrick Rosales MD sent at 7/7/2020  5:57 PM CDT -----  Please call the patient regarding her abnormal result. Inform her thather potassium is very low and we alexis to correct this too prevent muscle cramps and abnormal heart rhythms. Potassium called in to her pharmacy for her to take for a few days and I want to repeat her value in 2 weeks. nonfasting Lab orders are available at [facility]. Cholesterol is a little high so continue to watch her diet. Bloodwork ok other wise

## 2020-07-08 NOTE — TELEPHONE ENCOUNTER
Spoke with pt notified of the below message/rx/instructions per dr yen. Patient verbalized understanding to all.  Has already started potassium.  Informed to return call with questions/concerns -DN

## 2020-07-14 ENCOUNTER — PATIENT MESSAGE (OUTPATIENT)
Dept: FAMILY MEDICINE | Facility: CLINIC | Age: 35
End: 2020-07-14

## 2020-07-14 NOTE — TELEPHONE ENCOUNTER
Spoke with patient c/o R side Jaw pain that comes and goes.  Started Sunday evening.  Taking tylenol that does not seem to help.  States hard to open her mouth in the mornings to brush teeth, uncomfortable to open.  No swelling.  Could this be a side effect to meds recently started?  Jaw pain accompanied with HA yesterday also.   To dr yen for review -DN

## 2020-07-16 ENCOUNTER — PATIENT MESSAGE (OUTPATIENT)
Dept: FAMILY MEDICINE | Facility: CLINIC | Age: 35
End: 2020-07-16

## 2020-07-24 ENCOUNTER — TELEPHONE (OUTPATIENT)
Dept: FAMILY MEDICINE | Facility: CLINIC | Age: 35
End: 2020-07-24

## 2020-07-24 LAB — POTASSIUM SERPL-SCNC: 3.1 MMOL/L (ref 3.5–5.3)

## 2020-07-25 ENCOUNTER — PATIENT MESSAGE (OUTPATIENT)
Dept: FAMILY MEDICINE | Facility: CLINIC | Age: 35
End: 2020-07-25

## 2020-07-25 DIAGNOSIS — E87.6 LOW BLOOD POTASSIUM: Primary | ICD-10-CM

## 2020-07-25 RX ORDER — POTASSIUM CHLORIDE 20 MEQ/1
TABLET, EXTENDED RELEASE ORAL
Qty: 45 TABLET | Refills: 4 | Status: SHIPPED | OUTPATIENT
Start: 2020-07-25 | End: 2021-01-05 | Stop reason: SDUPTHER

## 2020-08-12 ENCOUNTER — PATIENT MESSAGE (OUTPATIENT)
Dept: FAMILY MEDICINE | Facility: CLINIC | Age: 35
End: 2020-08-12

## 2020-08-15 ENCOUNTER — OFFICE VISIT (OUTPATIENT)
Dept: FAMILY MEDICINE | Facility: CLINIC | Age: 35
End: 2020-08-15
Payer: COMMERCIAL

## 2020-08-15 VITALS — SYSTOLIC BLOOD PRESSURE: 117 MMHG | DIASTOLIC BLOOD PRESSURE: 85 MMHG

## 2020-08-15 DIAGNOSIS — I10 ESSENTIAL HYPERTENSION: ICD-10-CM

## 2020-08-15 DIAGNOSIS — E87.6 LOW POTASSIUM SYNDROME: ICD-10-CM

## 2020-08-15 DIAGNOSIS — F43.23 SITUATIONAL MIXED ANXIETY AND DEPRESSIVE DISORDER: Primary | ICD-10-CM

## 2020-08-15 PROCEDURE — 99214 PR OFFICE/OUTPT VISIT, EST, LEVL IV, 30-39 MIN: ICD-10-PCS | Mod: 95,,, | Performed by: FAMILY MEDICINE

## 2020-08-15 PROCEDURE — 3079F DIAST BP 80-89 MM HG: CPT | Mod: ,,, | Performed by: FAMILY MEDICINE

## 2020-08-15 PROCEDURE — 3079F PR MOST RECENT DIASTOLIC BLOOD PRESSURE 80-89 MM HG: ICD-10-PCS | Mod: ,,, | Performed by: FAMILY MEDICINE

## 2020-08-15 PROCEDURE — 3074F PR MOST RECENT SYSTOLIC BLOOD PRESSURE < 130 MM HG: ICD-10-PCS | Mod: ,,, | Performed by: FAMILY MEDICINE

## 2020-08-15 PROCEDURE — 3074F SYST BP LT 130 MM HG: CPT | Mod: ,,, | Performed by: FAMILY MEDICINE

## 2020-08-15 PROCEDURE — 99214 OFFICE O/P EST MOD 30 MIN: CPT | Mod: 95,,, | Performed by: FAMILY MEDICINE

## 2020-08-15 RX ORDER — SERTRALINE HYDROCHLORIDE 100 MG/1
150 TABLET, FILM COATED ORAL DAILY
Qty: 135 TABLET | Refills: 1 | Status: SHIPPED | OUTPATIENT
Start: 2020-08-15 | End: 2021-01-05

## 2020-08-15 RX ORDER — ALPRAZOLAM 0.5 MG/1
0.5 TABLET ORAL 2 TIMES DAILY PRN
Qty: 60 TABLET | Refills: 1 | Status: SHIPPED | OUTPATIENT
Start: 2020-08-15 | End: 2021-05-05 | Stop reason: SDUPTHER

## 2020-08-15 NOTE — PROGRESS NOTES
Subjective:        The chief complaint leading to consultation is: stress   The patient location is:  Home  Visit type: Virtual visit with synchronous audio/video or audio only  This was a video visit in lieu of in-person visit due to the coronavirus emergency. Patient acknowledged and consented to the video visit encounter.     Patient past medical history of hypertension as seen for visit.  Recent labs have demonstrated hypokalemia.  We have given her replacement and will need to recheck lab results once again.    Blood pressure medications were recently adjusted and she is doing well.  She checks routinely in her numbers are good.  She has recent complaints of headaches, fatigue and depressive symptoms. Notes feels overwhelmed. States she fees like she is at a breaking point. Notes doesn't feel safe at work. Most stress is on the job has direct contact with the public. Also has some concerns about her children and returns to school.. No SI or HI.  Does report anhedonia.  Has frequent crying spells of and trouble sleeping.        Orders Only on 07/07/2020   Component Date Value Ref Range Status    Potassium 07/23/2020 3.1* 3.5 - 5.3 mmol/L Final   Office Visit on 07/02/2020   Component Date Value Ref Range Status    Glucose 07/02/2020 95  65 - 99 mg/dL Final    BUN, Bld 07/02/2020 15  7 - 25 mg/dL Final    Creatinine 07/02/2020 0.76  0.50 - 1.10 mg/dL Final    eGFR if non African American 07/02/2020 103  > OR = 60 mL/min/1.73m2 Final    eGFR if African American 07/02/2020 119  > OR = 60 mL/min/1.73m2 Final    BUN/Creatinine Ratio 07/02/2020 NOT APPLICABLE  6 - 22 (calc) Final    Sodium 07/02/2020 140  135 - 146 mmol/L Final    Potassium 07/02/2020 2.9* 3.5 - 5.3 mmol/L Final    Chloride 07/02/2020 101  98 - 110 mmol/L Final    CO2 07/02/2020 30  20 - 32 mmol/L Final    Calcium 07/02/2020 9.7  8.6 - 10.2 mg/dL Final    Total Protein 07/02/2020 7.0  6.1 - 8.1 g/dL Final    Albumin 07/02/2020 4.4   3.6 - 5.1 g/dL Final    Globulin, Total 2020 2.6  1.9 - 3.7 g/dL (calc) Final    Albumin/Globulin Ratio 2020 1.7  1.0 - 2.5 (calc) Final    Total Bilirubin 2020 0.6  0.2 - 1.2 mg/dL Final    Alkaline Phosphatase 2020 53  31 - 125 U/L Final    AST 2020 18  10 - 30 U/L Final    ALT 2020 20  6 - 29 U/L Final    Cholesterol 2020 190  <200 mg/dL Final    HDL 2020 41* > OR = 50 mg/dL Final    Triglycerides 2020 133  <150 mg/dL Final    LDL Cholesterol 2020 124* mg/dL (calc) Final    Hdl/Cholesterol Ratio 2020 4.6  <5.0 (calc) Final    Non HDL Chol. (LDL+VLDL) 2020 149* <130 mg/dL (calc) Final    TSH w/reflex to FT4 2020 0.44  mIU/L Final       Past Surgical History:   Procedure Laterality Date     SECTION  2009     SECTION  2016    EUA; Excision of Anal Polyp  10/15/2018         Past Medical History:   Diagnosis Date    Goiter, non-toxic     Herpes simplex     Hypertension     PCOS (polycystic ovarian syndrome)     Right bundle branch block 2019     Family History   Problem Relation Age of Onset    Hypertension Mother     Breast cancer Maternal Aunt     Hypertension Maternal Aunt     Heart disease Maternal Grandmother     Heart disease Paternal Uncle         Social History:   Marital Status: Single  Alcohol History:  reports current alcohol use.  Tobacco History:  reports that she has never smoked. She has never used smokeless tobacco.  Drug History:  reports no history of drug use.    Review of patient's allergies indicates:  No Known Allergies    Current Outpatient Medications   Medication Sig Dispense Refill    ALPRAZolam (XANAX) 0.5 MG tablet Take 1 tablet (0.5 mg total) by mouth 2 (two) times daily as needed for Insomnia or Anxiety. 60 tablet 1    cyclobenzaprine (FLEXERIL) 10 MG tablet Take 1 tablet (10 mg total) by mouth nightly as needed. 90 tablet 1     etonogestrel-ethinyl estradiol (NUVARING) 0.12-0.015 mg/24 hr vaginal ring Insert vaginally and leave in place for 3 consecutive weeks, then remove for 1 week.      hydroCHLOROthiazide (HYDRODIURIL) 25 MG tablet Take 1 tablet (25 mg total) by mouth once daily. 90 tablet 3    olmesartan (BENICAR) 20 MG tablet Take 1 tablet (20 mg total) by mouth once daily. 90 tablet 3    potassium chloride SA (K-DUR,KLOR-CON) 20 MEQ tablet Twice a day for 2 weeks then once a day 45 tablet 4    sertraline (ZOLOFT) 100 MG tablet Take 1.5 tablets (150 mg total) by mouth once daily. 135 tablet 1    valACYclovir (VALTREX) 1000 MG tablet Take 1 tablet (1,000 mg total) by mouth 2 (two) times daily. 60 tablet 2     No current facility-administered medications for this visit.        Review of Systems   Constitutional: Positive for activity change. Negative for unexpected weight change.   HENT: Negative for hearing loss, rhinorrhea and trouble swallowing.    Eyes: Negative for discharge and visual disturbance.   Respiratory: Negative for chest tightness and wheezing.    Cardiovascular: Negative for chest pain and palpitations.   Gastrointestinal: Negative for blood in stool, constipation, diarrhea and vomiting.   Endocrine: Negative for polydipsia and polyuria.   Genitourinary: Negative for difficulty urinating, dysuria, hematuria and menstrual problem.   Musculoskeletal: Negative for arthralgias, joint swelling and neck pain.   Neurological: Positive for headaches. Negative for weakness.   Psychiatric/Behavioral: Positive for dysphoric mood. Negative for confusion.         Objective:        Physical Exam:   Physical Exam  Constitutional:       Appearance: Normal appearance.   HENT:      Head: Normocephalic and atraumatic.      Mouth/Throat:      Mouth: Mucous membranes are moist.   Eyes:      Conjunctiva/sclera: Conjunctivae normal.   Pulmonary:      Effort: Pulmonary effort is normal.   Neurological:      General: No focal deficit  present.      Mental Status: She is alert and oriented to person, place, and time.   Psychiatric:         Mood and Affect: Mood is depressed.         Behavior: Behavior normal.      Comments: tearful              Assessment:       1. Situational mixed anxiety and depressive disorder    2. Essential hypertension    3. Low potassium syndrome      Plan:   Situational mixed anxiety and depressive disorder  -     Ambulatory referral/consult to Psychology; Future; Expected date: 08/22/2020  -     sertraline (ZOLOFT) 100 MG tablet; Take 1.5 tablets (150 mg total) by mouth once daily.  Dispense: 135 tablet; Refill: 1  -     ALPRAZolam (XANAX) 0.5 MG tablet; Take 1 tablet (0.5 mg total) by mouth 2 (two) times daily as needed for Insomnia or Anxiety.  Dispense: 60 tablet; Refill: 1    Essential hypertension    Low potassium syndrome  -     Basic metabolic panel; Future; Expected date: 08/15/2020      Follow up in about 4 weeks (around 9/12/2020) for HTNm depression.    Total time spent with patient: 20 min    Each patient to whom he or she provides medical services by telemedicine is:  (1) informed of the relationship between the physician and patient and the respective role of any other health care provider with respect to management of the patient; and (2) notified that he or she may decline to receive medical services by telemedicine and may withdraw from such care at any time.    This note was created using [employer] voice recognition software that occasionally misinterprets phrases or words.

## 2020-08-15 NOTE — PATIENT INSTRUCTIONS
Take  Sertraline  1/2 pill for one week, then 1 pill for one week then increase to 1 1/2 tablets.

## 2020-08-17 ENCOUNTER — PATIENT MESSAGE (OUTPATIENT)
Dept: FAMILY MEDICINE | Facility: CLINIC | Age: 35
End: 2020-08-17

## 2020-08-19 ENCOUNTER — PATIENT MESSAGE (OUTPATIENT)
Dept: FAMILY MEDICINE | Facility: CLINIC | Age: 35
End: 2020-08-19

## 2020-08-19 ENCOUNTER — TELEPHONE (OUTPATIENT)
Dept: FAMILY MEDICINE | Facility: CLINIC | Age: 35
End: 2020-08-19

## 2020-08-19 NOTE — TELEPHONE ENCOUNTER
----- Message from Mariah Kearney sent at 8/19/2020 11:10 AM CDT -----  The patient dropped off a form to be filled out . Please fax it when its done.pt's # [phone] GH

## 2020-08-25 ENCOUNTER — TELEPHONE (OUTPATIENT)
Dept: FAMILY MEDICINE | Facility: CLINIC | Age: 35
End: 2020-08-25

## 2020-08-25 DIAGNOSIS — E87.6 LOW BLOOD POTASSIUM: Primary | ICD-10-CM

## 2020-08-25 NOTE — TELEPHONE ENCOUNTER
Is this ready? Pt says its due today. She dropped it off on the 17th. They have like 5 open messages about it. And she had some other questions.     Printed

## 2020-08-25 NOTE — TELEPHONE ENCOUNTER
----- Message from Taylor Stone sent at 8/25/2020  9:42 AM CDT -----  Regarding: paperwork  Pt is needing a call back on her paperwork for disability   Pt [phone]

## 2020-08-25 NOTE — TELEPHONE ENCOUNTER
Paperwork to be done today and I explained on her visit Saturday how to take her zoloft. 1/2 tab for a week, full tab for a week, then 1.5 tablets. I hope she isnt just starting the med when it was prescribed over a week ago. She can repeat potassium in 2 weeks

## 2020-08-25 NOTE — TELEPHONE ENCOUNTER
Pt also wants to know how she is supposed to take her zoloft? She doesn't think you wanted her to start off taking 150mg. Also when is she supposed to repeat her labs to repeat her potassium level?

## 2020-08-25 NOTE — TELEPHONE ENCOUNTER
----- Message from Taylor Stone sent at 8/25/2020  3:16 PM CDT -----  Regarding: paperwork  Regarding: paperwork TODAY IS THE LAST DAY FOR IT AND PT IS NEEDING IT FOR HER JOB   Pt is needing a call back on her paperwork for disability   Pt [phone]

## 2020-09-04 ENCOUNTER — PATIENT MESSAGE (OUTPATIENT)
Dept: FAMILY MEDICINE | Facility: CLINIC | Age: 35
End: 2020-09-04

## 2020-09-08 ENCOUNTER — TELEPHONE (OUTPATIENT)
Dept: PSYCHIATRY | Facility: CLINIC | Age: 35
End: 2020-09-08

## 2020-09-08 NOTE — TELEPHONE ENCOUNTER
Spoke to pt, she states she is seeking care somewhere else for therapy, but I gave her our number and told her to call if she would like to make an apt with Dr. Hoang, Psychiatry-[facility]

## 2020-09-09 ENCOUNTER — PATIENT MESSAGE (OUTPATIENT)
Dept: FAMILY MEDICINE | Facility: CLINIC | Age: 35
End: 2020-09-09

## 2020-09-10 ENCOUNTER — PATIENT MESSAGE (OUTPATIENT)
Dept: FAMILY MEDICINE | Facility: CLINIC | Age: 35
End: 2020-09-10

## 2020-09-11 LAB
BUN SERPL-MCNC: 7 MG/DL (ref 7–25)
BUN/CREAT SERPL: NORMAL (CALC) (ref 6–22)
CALCIUM SERPL-MCNC: 8.9 MG/DL (ref 8.6–10.2)
CHLORIDE SERPL-SCNC: 107 MMOL/L (ref 98–110)
CO2 SERPL-SCNC: 25 MMOL/L (ref 20–32)
CREAT SERPL-MCNC: 0.79 MG/DL (ref 0.5–1.1)
GFRSERPLBLD MDRD-ARVRAT: 98 ML/MIN/1.73M2
GLUCOSE SERPL-MCNC: 103 MG/DL (ref 65–139)
POTASSIUM SERPL-SCNC: 3.8 MMOL/L (ref 3.5–5.3)
SODIUM SERPL-SCNC: 141 MMOL/L (ref 135–146)

## 2020-12-02 DIAGNOSIS — I10 ESSENTIAL HYPERTENSION: ICD-10-CM

## 2020-12-02 RX ORDER — HYDROCHLOROTHIAZIDE 25 MG/1
25 TABLET ORAL DAILY
Qty: 90 TABLET | Refills: 3 | Status: SHIPPED | OUTPATIENT
Start: 2020-12-02 | End: 2021-05-05

## 2020-12-02 NOTE — TELEPHONE ENCOUNTER
----- Message from Taylor Stone sent at 12/2/2020 11:11 AM CST -----  Regarding: refills  hydroCHLOROthiazide (HYDRODIURIL) 25 MG tablet  [facility]   Pt [phone]

## 2020-12-21 ENCOUNTER — TELEPHONE (OUTPATIENT)
Dept: FAMILY MEDICINE | Facility: CLINIC | Age: 35
End: 2020-12-21

## 2020-12-21 DIAGNOSIS — E87.6 LOW POTASSIUM SYNDROME: ICD-10-CM

## 2020-12-21 DIAGNOSIS — E66.01 MORBID OBESITY: ICD-10-CM

## 2020-12-21 DIAGNOSIS — Z13.6 SCREENING FOR ISCHEMIC HEART DISEASE (IHD): ICD-10-CM

## 2020-12-21 DIAGNOSIS — I10 ESSENTIAL HYPERTENSION: Primary | ICD-10-CM

## 2020-12-21 DIAGNOSIS — E04.2 MULTINODULAR GOITER: ICD-10-CM

## 2020-12-23 NOTE — TELEPHONE ENCOUNTER
Pt notified to complete fasting labs one week prior to next appt.  Pt verbalized understanding, informed to return call with questions/concerns -DN

## 2020-12-30 LAB
ALBUMIN SERPL-MCNC: 4.1 G/DL (ref 3.6–5.1)
ALBUMIN/CREAT UR: 3 MCG/MG CREAT
ALBUMIN/GLOB SERPL: 1.4 (CALC) (ref 1–2.5)
ALP SERPL-CCNC: 77 U/L (ref 31–125)
ALT SERPL-CCNC: 10 U/L (ref 6–29)
AST SERPL-CCNC: 10 U/L (ref 10–30)
BASOPHILS # BLD AUTO: 51 CELLS/UL (ref 0–200)
BASOPHILS NFR BLD AUTO: 0.6 %
BILIRUB SERPL-MCNC: 0.6 MG/DL (ref 0.2–1.2)
BUN SERPL-MCNC: 10 MG/DL (ref 7–25)
BUN/CREAT SERPL: ABNORMAL (CALC) (ref 6–22)
CALCIUM SERPL-MCNC: 9.3 MG/DL (ref 8.6–10.2)
CHLORIDE SERPL-SCNC: 100 MMOL/L (ref 98–110)
CHOLEST SERPL-MCNC: 206 MG/DL
CHOLEST/HDLC SERPL: 4 (CALC)
CO2 SERPL-SCNC: 29 MMOL/L (ref 20–32)
CREAT SERPL-MCNC: 0.79 MG/DL (ref 0.5–1.1)
CREAT UR-MCNC: 230 MG/DL (ref 20–275)
EOSINOPHIL # BLD AUTO: 281 CELLS/UL (ref 15–500)
EOSINOPHIL NFR BLD AUTO: 3.3 %
ERYTHROCYTE [DISTWIDTH] IN BLOOD BY AUTOMATED COUNT: 12.1 % (ref 11–15)
GFRSERPLBLD MDRD-ARVRAT: 98 ML/MIN/1.73M2
GLOBULIN SER CALC-MCNC: 2.9 G/DL (CALC) (ref 1.9–3.7)
GLUCOSE SERPL-MCNC: 104 MG/DL (ref 65–99)
HCT VFR BLD AUTO: 39.7 % (ref 35–45)
HDLC SERPL-MCNC: 52 MG/DL
HGB BLD-MCNC: 12.9 G/DL (ref 11.7–15.5)
LDLC SERPL CALC-MCNC: 129 MG/DL (CALC)
LYMPHOCYTES # BLD AUTO: 2593 CELLS/UL (ref 850–3900)
LYMPHOCYTES NFR BLD AUTO: 30.5 %
MCH RBC QN AUTO: 27.4 PG (ref 27–33)
MCHC RBC AUTO-ENTMCNC: 32.5 G/DL (ref 32–36)
MCV RBC AUTO: 84.3 FL (ref 80–100)
MICROALBUMIN UR-MCNC: 0.7 MG/DL
MONOCYTES # BLD AUTO: 442 CELLS/UL (ref 200–950)
MONOCYTES NFR BLD AUTO: 5.2 %
NEUTROPHILS # BLD AUTO: 5134 CELLS/UL (ref 1500–7800)
NEUTROPHILS NFR BLD AUTO: 60.4 %
NONHDLC SERPL-MCNC: 154 MG/DL (CALC)
PLATELET # BLD AUTO: 358 THOUSAND/UL (ref 140–400)
PMV BLD REES-ECKER: 9.4 FL (ref 7.5–12.5)
POTASSIUM SERPL-SCNC: 3.3 MMOL/L (ref 3.5–5.3)
PROT SERPL-MCNC: 7 G/DL (ref 6.1–8.1)
RBC # BLD AUTO: 4.71 MILLION/UL (ref 3.8–5.1)
SODIUM SERPL-SCNC: 139 MMOL/L (ref 135–146)
TRIGL SERPL-MCNC: 142 MG/DL
TSH SERPL-ACNC: 0.7 MIU/L
WBC # BLD AUTO: 8.5 THOUSAND/UL (ref 3.8–10.8)

## 2021-01-05 ENCOUNTER — OFFICE VISIT (OUTPATIENT)
Dept: FAMILY MEDICINE | Facility: CLINIC | Age: 36
End: 2021-01-05
Payer: COMMERCIAL

## 2021-01-05 VITALS
HEIGHT: 65 IN | HEART RATE: 101 BPM | SYSTOLIC BLOOD PRESSURE: 130 MMHG | BODY MASS INDEX: 43.49 KG/M2 | WEIGHT: 261 LBS | DIASTOLIC BLOOD PRESSURE: 88 MMHG

## 2021-01-05 DIAGNOSIS — I10 ESSENTIAL HYPERTENSION: Primary | ICD-10-CM

## 2021-01-05 DIAGNOSIS — E87.6 LOW POTASSIUM SYNDROME: ICD-10-CM

## 2021-01-05 DIAGNOSIS — F43.23 SITUATIONAL MIXED ANXIETY AND DEPRESSIVE DISORDER: ICD-10-CM

## 2021-01-05 DIAGNOSIS — E87.6 LOW BLOOD POTASSIUM: ICD-10-CM

## 2021-01-05 PROCEDURE — 3075F SYST BP GE 130 - 139MM HG: CPT | Mod: S$GLB,,, | Performed by: FAMILY MEDICINE

## 2021-01-05 PROCEDURE — 99214 PR OFFICE/OUTPT VISIT, EST, LEVL IV, 30-39 MIN: ICD-10-PCS | Mod: S$GLB,,, | Performed by: FAMILY MEDICINE

## 2021-01-05 PROCEDURE — 3075F PR MOST RECENT SYSTOLIC BLOOD PRESS GE 130-139MM HG: ICD-10-PCS | Mod: S$GLB,,, | Performed by: FAMILY MEDICINE

## 2021-01-05 PROCEDURE — 3079F DIAST BP 80-89 MM HG: CPT | Mod: S$GLB,,, | Performed by: FAMILY MEDICINE

## 2021-01-05 PROCEDURE — 99214 OFFICE O/P EST MOD 30 MIN: CPT | Mod: S$GLB,,, | Performed by: FAMILY MEDICINE

## 2021-01-05 PROCEDURE — 3079F PR MOST RECENT DIASTOLIC BLOOD PRESSURE 80-89 MM HG: ICD-10-PCS | Mod: S$GLB,,, | Performed by: FAMILY MEDICINE

## 2021-01-05 PROCEDURE — 3008F BODY MASS INDEX DOCD: CPT | Mod: S$GLB,,, | Performed by: FAMILY MEDICINE

## 2021-01-05 PROCEDURE — 3008F PR BODY MASS INDEX (BMI) DOCUMENTED: ICD-10-PCS | Mod: S$GLB,,, | Performed by: FAMILY MEDICINE

## 2021-01-05 RX ORDER — POTASSIUM CHLORIDE 20 MEQ/1
20 TABLET, EXTENDED RELEASE ORAL DAILY
Qty: 90 TABLET | Refills: 1 | Status: SHIPPED | OUTPATIENT
Start: 2021-01-05 | End: 2021-05-05

## 2021-01-31 ENCOUNTER — PATIENT MESSAGE (OUTPATIENT)
Dept: FAMILY MEDICINE | Facility: CLINIC | Age: 36
End: 2021-01-31

## 2021-02-11 ENCOUNTER — PATIENT MESSAGE (OUTPATIENT)
Dept: FAMILY MEDICINE | Facility: CLINIC | Age: 36
End: 2021-02-11

## 2021-04-21 ENCOUNTER — TELEPHONE (OUTPATIENT)
Dept: FAMILY MEDICINE | Facility: CLINIC | Age: 36
End: 2021-04-21

## 2021-04-29 ENCOUNTER — PATIENT MESSAGE (OUTPATIENT)
Dept: RESEARCH | Facility: HOSPITAL | Age: 36
End: 2021-04-29

## 2021-05-03 LAB
BUN SERPL-MCNC: 12 MG/DL (ref 7–25)
BUN/CREAT SERPL: NORMAL (CALC) (ref 6–22)
CALCIUM SERPL-MCNC: 9.2 MG/DL (ref 8.6–10.2)
CHLORIDE SERPL-SCNC: 107 MMOL/L (ref 98–110)
CHOLEST SERPL-MCNC: 157 MG/DL
CHOLEST/HDLC SERPL: 4.6 (CALC)
CO2 SERPL-SCNC: 23 MMOL/L (ref 20–32)
CREAT SERPL-MCNC: 0.75 MG/DL (ref 0.5–1.1)
GLUCOSE SERPL-MCNC: 74 MG/DL (ref 65–99)
HDLC SERPL-MCNC: 34 MG/DL
LDLC SERPL CALC-MCNC: 100 MG/DL (CALC)
NONHDLC SERPL-MCNC: 123 MG/DL (CALC)
POTASSIUM SERPL-SCNC: 4.1 MMOL/L (ref 3.5–5.3)
SODIUM SERPL-SCNC: 143 MMOL/L (ref 135–146)
TRIGL SERPL-MCNC: 134 MG/DL

## 2021-05-05 ENCOUNTER — OFFICE VISIT (OUTPATIENT)
Dept: FAMILY MEDICINE | Facility: CLINIC | Age: 36
End: 2021-05-05
Payer: COMMERCIAL

## 2021-05-05 VITALS
SYSTOLIC BLOOD PRESSURE: 112 MMHG | BODY MASS INDEX: 39.49 KG/M2 | HEART RATE: 76 BPM | WEIGHT: 237 LBS | DIASTOLIC BLOOD PRESSURE: 90 MMHG | HEIGHT: 65 IN

## 2021-05-05 DIAGNOSIS — Z98.84 STATUS POST LAPAROSCOPIC SLEEVE GASTRECTOMY: ICD-10-CM

## 2021-05-05 DIAGNOSIS — I10 ESSENTIAL HYPERTENSION: Primary | ICD-10-CM

## 2021-05-05 DIAGNOSIS — E66.01 MORBID OBESITY: ICD-10-CM

## 2021-05-05 DIAGNOSIS — K90.9 INTESTINAL MALABSORPTION, UNSPECIFIED TYPE: ICD-10-CM

## 2021-05-05 DIAGNOSIS — F43.23 SITUATIONAL MIXED ANXIETY AND DEPRESSIVE DISORDER: ICD-10-CM

## 2021-05-05 PROBLEM — E87.6 LOW POTASSIUM SYNDROME: Status: RESOLVED | Noted: 2020-08-15 | Resolved: 2021-05-05

## 2021-05-05 PROCEDURE — 3008F BODY MASS INDEX DOCD: CPT | Mod: S$GLB,,, | Performed by: FAMILY MEDICINE

## 2021-05-05 PROCEDURE — 3008F PR BODY MASS INDEX (BMI) DOCUMENTED: ICD-10-PCS | Mod: S$GLB,,, | Performed by: FAMILY MEDICINE

## 2021-05-05 PROCEDURE — 3074F PR MOST RECENT SYSTOLIC BLOOD PRESSURE < 130 MM HG: ICD-10-PCS | Mod: S$GLB,,, | Performed by: FAMILY MEDICINE

## 2021-05-05 PROCEDURE — 99214 OFFICE O/P EST MOD 30 MIN: CPT | Mod: S$GLB,,, | Performed by: FAMILY MEDICINE

## 2021-05-05 PROCEDURE — 3080F PR MOST RECENT DIASTOLIC BLOOD PRESSURE >= 90 MM HG: ICD-10-PCS | Mod: S$GLB,,, | Performed by: FAMILY MEDICINE

## 2021-05-05 PROCEDURE — 3074F SYST BP LT 130 MM HG: CPT | Mod: S$GLB,,, | Performed by: FAMILY MEDICINE

## 2021-05-05 PROCEDURE — 3080F DIAST BP >= 90 MM HG: CPT | Mod: S$GLB,,, | Performed by: FAMILY MEDICINE

## 2021-05-05 PROCEDURE — 99214 PR OFFICE/OUTPT VISIT, EST, LEVL IV, 30-39 MIN: ICD-10-PCS | Mod: S$GLB,,, | Performed by: FAMILY MEDICINE

## 2021-05-05 RX ORDER — FAMOTIDINE 40 MG/1
40 TABLET, FILM COATED ORAL 2 TIMES DAILY
COMMUNITY
Start: 2021-01-29 | End: 2021-09-08

## 2021-05-05 RX ORDER — ONDANSETRON 4 MG/1
TABLET, FILM COATED ORAL
COMMUNITY
Start: 2021-01-29 | End: 2021-09-08

## 2021-05-05 RX ORDER — ALPRAZOLAM 0.5 MG/1
0.5 TABLET ORAL 2 TIMES DAILY PRN
Qty: 60 TABLET | Refills: 1 | Status: SHIPPED | OUTPATIENT
Start: 2021-05-05 | End: 2022-06-28 | Stop reason: SDUPTHER

## 2021-08-25 ENCOUNTER — TELEPHONE (OUTPATIENT)
Dept: FAMILY MEDICINE | Facility: CLINIC | Age: 36
End: 2021-08-25

## 2021-09-03 LAB
25(OH)D3 SERPL-MCNC: 40 NG/ML (ref 30–100)
BASOPHILS # BLD AUTO: 32 CELLS/UL (ref 0–200)
BASOPHILS NFR BLD AUTO: 0.7 %
BUN SERPL-MCNC: 9 MG/DL (ref 7–25)
BUN/CREAT SERPL: NORMAL (CALC) (ref 6–22)
CALCIUM SERPL-MCNC: 9.6 MG/DL (ref 8.6–10.2)
CHLORIDE SERPL-SCNC: 103 MMOL/L (ref 98–110)
CO2 SERPL-SCNC: 30 MMOL/L (ref 20–32)
CREAT SERPL-MCNC: 0.68 MG/DL (ref 0.5–1.1)
EOSINOPHIL # BLD AUTO: 302 CELLS/UL (ref 15–500)
EOSINOPHIL NFR BLD AUTO: 6.7 %
ERYTHROCYTE [DISTWIDTH] IN BLOOD BY AUTOMATED COUNT: 11.9 % (ref 11–15)
GLUCOSE SERPL-MCNC: 86 MG/DL (ref 65–99)
HCT VFR BLD AUTO: 40 % (ref 35–45)
HGB BLD-MCNC: 13.3 G/DL (ref 11.7–15.5)
IRON SERPL-MCNC: 110 MCG/DL (ref 40–190)
LYMPHOCYTES # BLD AUTO: 1814 CELLS/UL (ref 850–3900)
LYMPHOCYTES NFR BLD AUTO: 40.3 %
MCH RBC QN AUTO: 28.1 PG (ref 27–33)
MCHC RBC AUTO-ENTMCNC: 33.3 G/DL (ref 32–36)
MCV RBC AUTO: 84.6 FL (ref 80–100)
MONOCYTES # BLD AUTO: 342 CELLS/UL (ref 200–950)
MONOCYTES NFR BLD AUTO: 7.6 %
NEUTROPHILS # BLD AUTO: 2012 CELLS/UL (ref 1500–7800)
NEUTROPHILS NFR BLD AUTO: 44.7 %
PLATELET # BLD AUTO: 295 THOUSAND/UL (ref 140–400)
PMV BLD REES-ECKER: 10.6 FL (ref 7.5–12.5)
POTASSIUM SERPL-SCNC: 3.7 MMOL/L (ref 3.5–5.3)
PREALB SERPL-MCNC: 20 MG/DL (ref 17–34)
RBC # BLD AUTO: 4.73 MILLION/UL (ref 3.8–5.1)
SODIUM SERPL-SCNC: 141 MMOL/L (ref 135–146)
VIT B12 SERPL-MCNC: >2000 PG/ML (ref 200–1100)
WBC # BLD AUTO: 4.5 THOUSAND/UL (ref 3.8–10.8)

## 2021-09-07 ENCOUNTER — PATIENT MESSAGE (OUTPATIENT)
Dept: FAMILY MEDICINE | Facility: CLINIC | Age: 36
End: 2021-09-07

## 2021-09-08 ENCOUNTER — OFFICE VISIT (OUTPATIENT)
Dept: FAMILY MEDICINE | Facility: CLINIC | Age: 36
End: 2021-09-08
Payer: COMMERCIAL

## 2021-09-08 VITALS
SYSTOLIC BLOOD PRESSURE: 120 MMHG | WEIGHT: 204 LBS | HEIGHT: 65 IN | DIASTOLIC BLOOD PRESSURE: 78 MMHG | HEART RATE: 74 BPM | BODY MASS INDEX: 33.99 KG/M2

## 2021-09-08 DIAGNOSIS — F43.23 SITUATIONAL MIXED ANXIETY AND DEPRESSIVE DISORDER: ICD-10-CM

## 2021-09-08 DIAGNOSIS — E04.2 MULTINODULAR GOITER: ICD-10-CM

## 2021-09-08 DIAGNOSIS — Z98.84 STATUS POST LAPAROSCOPIC SLEEVE GASTRECTOMY: ICD-10-CM

## 2021-09-08 DIAGNOSIS — E87.6 LOW POTASSIUM SYNDROME: ICD-10-CM

## 2021-09-08 DIAGNOSIS — I10 ESSENTIAL HYPERTENSION: Primary | ICD-10-CM

## 2021-09-08 PROCEDURE — 4010F ACE/ARB THERAPY RXD/TAKEN: CPT | Mod: S$GLB,,, | Performed by: FAMILY MEDICINE

## 2021-09-08 PROCEDURE — 4010F PR ACE/ARB THEARPY RXD/TAKEN: ICD-10-PCS | Mod: S$GLB,,, | Performed by: FAMILY MEDICINE

## 2021-09-08 PROCEDURE — 3078F DIAST BP <80 MM HG: CPT | Mod: S$GLB,,, | Performed by: FAMILY MEDICINE

## 2021-09-08 PROCEDURE — 3074F SYST BP LT 130 MM HG: CPT | Mod: S$GLB,,, | Performed by: FAMILY MEDICINE

## 2021-09-08 PROCEDURE — 3008F BODY MASS INDEX DOCD: CPT | Mod: S$GLB,,, | Performed by: FAMILY MEDICINE

## 2021-09-08 PROCEDURE — 99214 PR OFFICE/OUTPT VISIT, EST, LEVL IV, 30-39 MIN: ICD-10-PCS | Mod: S$GLB,,, | Performed by: FAMILY MEDICINE

## 2021-09-08 PROCEDURE — 3078F PR MOST RECENT DIASTOLIC BLOOD PRESSURE < 80 MM HG: ICD-10-PCS | Mod: S$GLB,,, | Performed by: FAMILY MEDICINE

## 2021-09-08 PROCEDURE — 1160F RVW MEDS BY RX/DR IN RCRD: CPT | Mod: S$GLB,,, | Performed by: FAMILY MEDICINE

## 2021-09-08 PROCEDURE — 1160F PR REVIEW ALL MEDS BY PRESCRIBER/CLIN PHARMACIST DOCUMENTED: ICD-10-PCS | Mod: S$GLB,,, | Performed by: FAMILY MEDICINE

## 2021-09-08 PROCEDURE — 99214 OFFICE O/P EST MOD 30 MIN: CPT | Mod: S$GLB,,, | Performed by: FAMILY MEDICINE

## 2021-09-08 PROCEDURE — 3008F PR BODY MASS INDEX (BMI) DOCUMENTED: ICD-10-PCS | Mod: S$GLB,,, | Performed by: FAMILY MEDICINE

## 2021-09-08 PROCEDURE — 3074F PR MOST RECENT SYSTOLIC BLOOD PRESSURE < 130 MM HG: ICD-10-PCS | Mod: S$GLB,,, | Performed by: FAMILY MEDICINE

## 2021-09-08 RX ORDER — SEGESTERONE ACETATE AND ETHINYL ESTRADIOL 103; 17.4 MG/1; MG/1
1 RING VAGINAL
COMMUNITY
Start: 2021-08-09 | End: 2022-11-07

## 2021-09-08 RX ORDER — OLMESARTAN MEDOXOMIL 5 MG/1
5 TABLET ORAL DAILY
Qty: 90 TABLET | Refills: 3 | Status: SHIPPED | OUTPATIENT
Start: 2021-09-08 | End: 2022-08-31 | Stop reason: SDUPTHER

## 2021-09-29 ENCOUNTER — PATIENT MESSAGE (OUTPATIENT)
Dept: FAMILY MEDICINE | Facility: CLINIC | Age: 36
End: 2021-09-29

## 2021-09-30 ENCOUNTER — PATIENT MESSAGE (OUTPATIENT)
Dept: FAMILY MEDICINE | Facility: CLINIC | Age: 36
End: 2021-09-30

## 2021-11-08 ENCOUNTER — PATIENT MESSAGE (OUTPATIENT)
Dept: FAMILY MEDICINE | Facility: CLINIC | Age: 36
End: 2021-11-08
Payer: COMMERCIAL

## 2021-11-08 DIAGNOSIS — E87.6 LOW SERUM POTASSIUM LEVEL: Primary | ICD-10-CM

## 2021-11-11 ENCOUNTER — PATIENT MESSAGE (OUTPATIENT)
Dept: FAMILY MEDICINE | Facility: CLINIC | Age: 36
End: 2021-11-11
Payer: COMMERCIAL

## 2021-11-13 LAB
BUN SERPL-MCNC: 11 MG/DL (ref 7–25)
BUN/CREAT SERPL: NORMAL (CALC) (ref 6–22)
CALCIUM SERPL-MCNC: 9.2 MG/DL (ref 8.6–10.2)
CHLORIDE SERPL-SCNC: 106 MMOL/L (ref 98–110)
CO2 SERPL-SCNC: 26 MMOL/L (ref 20–32)
CREAT SERPL-MCNC: 0.65 MG/DL (ref 0.5–1.1)
GLUCOSE SERPL-MCNC: 94 MG/DL (ref 65–99)
POTASSIUM SERPL-SCNC: 4.5 MMOL/L (ref 3.5–5.3)
SODIUM SERPL-SCNC: 140 MMOL/L (ref 135–146)

## 2021-11-15 ENCOUNTER — PATIENT MESSAGE (OUTPATIENT)
Dept: FAMILY MEDICINE | Facility: CLINIC | Age: 36
End: 2021-11-15
Payer: COMMERCIAL

## 2021-11-15 DIAGNOSIS — E87.6 LOW BLOOD POTASSIUM: ICD-10-CM

## 2021-11-15 RX ORDER — POTASSIUM CHLORIDE 20 MEQ/1
20 TABLET, EXTENDED RELEASE ORAL DAILY
Qty: 90 TABLET | Refills: 1 | Status: SHIPPED | OUTPATIENT
Start: 2021-11-15 | End: 2022-05-04 | Stop reason: SDUPTHER

## 2022-05-01 ENCOUNTER — PATIENT MESSAGE (OUTPATIENT)
Dept: FAMILY MEDICINE | Facility: CLINIC | Age: 37
End: 2022-05-01

## 2022-05-01 DIAGNOSIS — E66.01 MORBID OBESITY: ICD-10-CM

## 2022-05-01 DIAGNOSIS — E87.6 LOW SERUM POTASSIUM LEVEL: ICD-10-CM

## 2022-05-01 DIAGNOSIS — I10 ESSENTIAL HYPERTENSION: Primary | ICD-10-CM

## 2022-05-04 ENCOUNTER — OFFICE VISIT (OUTPATIENT)
Dept: FAMILY MEDICINE | Facility: CLINIC | Age: 37
End: 2022-05-04
Payer: COMMERCIAL

## 2022-05-04 DIAGNOSIS — E87.6 LOW BLOOD POTASSIUM: ICD-10-CM

## 2022-05-04 DIAGNOSIS — M79.3 PANNICULITIS: ICD-10-CM

## 2022-05-04 DIAGNOSIS — K90.9 INTESTINAL MALABSORPTION, UNSPECIFIED TYPE: ICD-10-CM

## 2022-05-04 DIAGNOSIS — I10 ESSENTIAL HYPERTENSION: Primary | ICD-10-CM

## 2022-05-04 LAB
ALBUMIN SERPL-MCNC: 4 G/DL (ref 3.6–5.1)
ALBUMIN/CREAT UR: 5 MCG/MG CREAT
ALBUMIN/GLOB SERPL: 1.5 (CALC) (ref 1–2.5)
ALP SERPL-CCNC: 60 U/L (ref 31–125)
ALT SERPL-CCNC: 8 U/L (ref 6–29)
AST SERPL-CCNC: 12 U/L (ref 10–30)
BASOPHILS # BLD AUTO: 41 CELLS/UL (ref 0–200)
BASOPHILS NFR BLD AUTO: 0.8 %
BILIRUB SERPL-MCNC: 0.6 MG/DL (ref 0.2–1.2)
BUN SERPL-MCNC: 9 MG/DL (ref 7–25)
BUN/CREAT SERPL: NORMAL (CALC) (ref 6–22)
CALCIUM SERPL-MCNC: 9.4 MG/DL (ref 8.6–10.2)
CHLORIDE SERPL-SCNC: 109 MMOL/L (ref 98–110)
CHOLEST SERPL-MCNC: 208 MG/DL
CHOLEST/HDLC SERPL: 3.4 (CALC)
CO2 SERPL-SCNC: 28 MMOL/L (ref 20–32)
CREAT SERPL-MCNC: 0.77 MG/DL (ref 0.5–1.1)
CREAT UR-MCNC: 110 MG/DL (ref 20–275)
EOSINOPHIL # BLD AUTO: 270 CELLS/UL (ref 15–500)
EOSINOPHIL NFR BLD AUTO: 5.3 %
ERYTHROCYTE [DISTWIDTH] IN BLOOD BY AUTOMATED COUNT: 11.2 % (ref 11–15)
GLOBULIN SER CALC-MCNC: 2.6 G/DL (CALC) (ref 1.9–3.7)
GLUCOSE SERPL-MCNC: 83 MG/DL (ref 65–99)
HCT VFR BLD AUTO: 39.8 % (ref 35–45)
HDLC SERPL-MCNC: 62 MG/DL
HGB BLD-MCNC: 12.8 G/DL (ref 11.7–15.5)
LDLC SERPL CALC-MCNC: 127 MG/DL (CALC)
LYMPHOCYTES # BLD AUTO: 2162 CELLS/UL (ref 850–3900)
LYMPHOCYTES NFR BLD AUTO: 42.4 %
MCH RBC QN AUTO: 28 PG (ref 27–33)
MCHC RBC AUTO-ENTMCNC: 32.2 G/DL (ref 32–36)
MCV RBC AUTO: 87.1 FL (ref 80–100)
MICROALBUMIN UR-MCNC: 0.5 MG/DL
MONOCYTES # BLD AUTO: 291 CELLS/UL (ref 200–950)
MONOCYTES NFR BLD AUTO: 5.7 %
NEUTROPHILS # BLD AUTO: 2336 CELLS/UL (ref 1500–7800)
NEUTROPHILS NFR BLD AUTO: 45.8 %
NONHDLC SERPL-MCNC: 146 MG/DL (CALC)
PLATELET # BLD AUTO: 274 THOUSAND/UL (ref 140–400)
PMV BLD REES-ECKER: 10.1 FL (ref 7.5–12.5)
POTASSIUM SERPL-SCNC: 4.7 MMOL/L (ref 3.5–5.3)
PROT SERPL-MCNC: 6.6 G/DL (ref 6.1–8.1)
RBC # BLD AUTO: 4.57 MILLION/UL (ref 3.8–5.1)
SODIUM SERPL-SCNC: 143 MMOL/L (ref 135–146)
TRIGL SERPL-MCNC: 90 MG/DL
TSH SERPL-ACNC: 0.52 MIU/L
WBC # BLD AUTO: 5.1 THOUSAND/UL (ref 3.8–10.8)

## 2022-05-04 PROCEDURE — 99214 OFFICE O/P EST MOD 30 MIN: CPT | Mod: S$GLB,,, | Performed by: FAMILY MEDICINE

## 2022-05-04 PROCEDURE — 99214 PR OFFICE/OUTPT VISIT, EST, LEVL IV, 30-39 MIN: ICD-10-PCS | Mod: S$GLB,,, | Performed by: FAMILY MEDICINE

## 2022-05-04 PROCEDURE — 1159F MED LIST DOCD IN RCRD: CPT | Mod: CPTII,S$GLB,, | Performed by: FAMILY MEDICINE

## 2022-05-04 PROCEDURE — 1159F PR MEDICATION LIST DOCUMENTED IN MEDICAL RECORD: ICD-10-PCS | Mod: CPTII,S$GLB,, | Performed by: FAMILY MEDICINE

## 2022-05-04 RX ORDER — POTASSIUM CHLORIDE 20 MEQ/1
20 TABLET, EXTENDED RELEASE ORAL DAILY
Qty: 90 TABLET | Refills: 1 | Status: SHIPPED | OUTPATIENT
Start: 2022-05-04 | End: 2022-11-07

## 2022-05-04 RX ORDER — NYSTATIN 100000 [USP'U]/G
POWDER TOPICAL 2 TIMES DAILY
Qty: 60 G | Refills: 1 | Status: SHIPPED | OUTPATIENT
Start: 2022-05-04 | End: 2022-11-07

## 2022-05-04 NOTE — PROGRESS NOTES
SUBJECTIVE:    Patient ID: Nani Orr is a 36 y.o. female.    Chief Complaint: Hypertension (Went over meds verbally// SW)    Patient past medical history of hypertension and hypokalemia presents for her visit.  She has been doing well.  Blood pressure is well controlled.  Labs reveal acceptable lipid panel and completely normal CMP.  Patient is also status post sleeve gastrectomy and is down 35 more lb since her last visit.  In addition she has had reduction mammoplasty.  She is satisfied with her results.  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  Unfortunately she has noted with her weight loss that she has frequent irritation on her abdominal wall from the overhanging skin.  She has been using topical therapies to help with inflammation, to keep the skin dry and to also help with ingrown hairs.  The problem has become more persistent as her weight loss has continued.  Patient is up-to-date with immunizations.  Gyn appointment up-to-date.  Does have plans for tubal ligation       Patient Message on 05/01/2022   Component Date Value Ref Range Status    Cholesterol 05/03/2022 208 (A) <200 mg/dL Final    HDL 05/03/2022 62  > OR = 50 mg/dL Final    Triglycerides 05/03/2022 90  <150 mg/dL Final    LDL Cholesterol 05/03/2022 127 (A) mg/dL (calc) Final    HDL/Cholesterol Ratio 05/03/2022 3.4  <5.0 (calc) Final    Non HDL Chol. (LDL+VLDL) 05/03/2022 146 (A) <130 mg/dL (calc) Final    WBC 05/03/2022 5.1  3.8 - 10.8 Thousand/uL Final    RBC 05/03/2022 4.57  3.80 - 5.10 Million/uL Final    Hemoglobin 05/03/2022 12.8  11.7 - 15.5 g/dL Final    Hematocrit 05/03/2022 39.8  35.0 - 45.0 % Final    MCV 05/03/2022 87.1  80.0 - 100.0 fL Final    MCH 05/03/2022 28.0  27.0 - 33.0 pg Final    MCHC 05/03/2022 32.2  32.0 - 36.0 g/dL Final    RDW 05/03/2022 11.2  11.0 - 15.0 % Final    Platelets 05/03/2022 274  140 - 400 Thousand/uL Final    MPV  05/03/2022 10.1  7.5 - 12.5 fL Final    Neutrophils, Abs 05/03/2022 2,336  1,500 - 7,800 cells/uL Final    Lymph # 05/03/2022 2,162  850 - 3,900 cells/uL Final    Mono # 05/03/2022 291  200 - 950 cells/uL Final    Eos # 05/03/2022 270  15 - 500 cells/uL Final    Baso # 05/03/2022 41  0 - 200 cells/uL Final    Neutrophils Relative 05/03/2022 45.8  % Final    Lymph % 05/03/2022 42.4  % Final    Mono % 05/03/2022 5.7  % Final    Eosinophil % 05/03/2022 5.3  % Final    Basophil % 05/03/2022 0.8  % Final    Glucose 05/03/2022 83  65 - 99 mg/dL Final    BUN 05/03/2022 9  7 - 25 mg/dL Final    Creatinine 05/03/2022 0.77  0.50 - 1.10 mg/dL Final    eGFR if non African American 05/03/2022 100  > OR = 60 mL/min/1.73m2 Final    eGFR if  05/03/2022 116  > OR = 60 mL/min/1.73m2 Final    BUN/Creatinine Ratio 05/03/2022 NOT APPLICABLE  6 - 22 (calc) Final    Sodium 05/03/2022 143  135 - 146 mmol/L Final    Potassium 05/03/2022 4.7  3.5 - 5.3 mmol/L Final    Chloride 05/03/2022 109  98 - 110 mmol/L Final    CO2 05/03/2022 28  20 - 32 mmol/L Final    Calcium 05/03/2022 9.4  8.6 - 10.2 mg/dL Final    Total Protein 05/03/2022 6.6  6.1 - 8.1 g/dL Final    Albumin 05/03/2022 4.0  3.6 - 5.1 g/dL Final    Globulin, Total 05/03/2022 2.6  1.9 - 3.7 g/dL (calc) Final    Albumin/Globulin Ratio 05/03/2022 1.5  1.0 - 2.5 (calc) Final    Total Bilirubin 05/03/2022 0.6  0.2 - 1.2 mg/dL Final    Alkaline Phosphatase 05/03/2022 60  31 - 125 U/L Final    AST 05/03/2022 12  10 - 30 U/L Final    ALT 05/03/2022 8  6 - 29 U/L Final    TSH w/reflex to FT4 05/03/2022 0.52  mIU/L Final   Patient Message on 11/08/2021   Component Date Value Ref Range Status    Glucose 11/12/2021 94  65 - 99 mg/dL Final    BUN 11/12/2021 11  7 - 25 mg/dL Final    Creatinine 11/12/2021 0.65  0.50 - 1.10 mg/dL Final    eGFR if non African American 11/12/2021 115  > OR = 60 mL/min/1.73m2 Final    eGFR if   2021 133  > OR = 60 mL/min/1.73m2 Final    BUN/Creatinine Ratio 2021 NOT APPLICABLE  6 -  (calc) Final    Sodium 2021 140  135 - 146 mmol/L Final    Potassium 2021 4.5  3.5 - 5.3 mmol/L Final    Chloride 2021 106  98 - 110 mmol/L Final    CO2 2021 26  20 - 32 mmol/L Final    Calcium 2021 9.2  8.6 - 10.2 mg/dL Final       Past Medical History:   Diagnosis Date    Goiter, non-toxic     Herpes simplex     Hypertension     PCOS (polycystic ovarian syndrome)     Right bundle branch block 2019    S/P bilateral breast reduction      Past Surgical History:   Procedure Laterality Date    BARIATRIC SURGERY  2021    gastric sleeve     SECTION  2009     SECTION  2016    EUA; Excision of Anal Polyp  10/15/2018        TOTAL REDUCTION MAMMOPLASTY Bilateral 2021     Family History   Problem Relation Age of Onset    Hypertension Mother     Breast cancer Maternal Aunt     Hypertension Maternal Aunt     Heart disease Maternal Grandmother     Heart disease Paternal Uncle        Marital Status: Single  Alcohol History:  reports previous alcohol use.  Tobacco History:  reports that she has never smoked. She has never used smokeless tobacco.  Drug History:  reports no history of drug use.    Review of patient's allergies indicates:  No Known Allergies    Current Outpatient Medications:     ANNOVERA 0.15-0.013 mg/24 hour Ring, Place 1 Device vaginally every 28 days., Disp: , Rfl:     olmesartan (BENICAR) 5 MG Tab, Take 1 tablet (5 mg total) by mouth once daily. For blood pressure, Disp: 90 tablet, Rfl: 3    ALPRAZolam (XANAX) 0.5 MG tablet, Take 1 tablet (0.5 mg total) by mouth 2 (two) times daily as needed for Insomnia or Anxiety., Disp: 60 tablet, Rfl: 1    potassium chloride SA (K-DUR,KLOR-CON) 20 MEQ tablet, Take 1 tablet (20 mEq total) by mouth once daily., Disp: 90 tablet, Rfl: 1    Review of Systems  "  Constitutional: Negative for activity change, fatigue and unexpected weight change.   HENT: Negative for hearing loss, postnasal drip, sinus pressure, sore throat and voice change.    Eyes: Negative for photophobia and visual disturbance.   Respiratory: Negative for cough, shortness of breath and wheezing.    Cardiovascular: Negative for chest pain and palpitations.   Gastrointestinal: Negative for constipation, diarrhea and nausea.   Genitourinary: Negative for difficulty urinating, frequency, hematuria and urgency.   Musculoskeletal: Negative for arthralgias and back pain.   Skin: Positive for rash.   Neurological: Negative for weakness, light-headedness and headaches.   Hematological: Negative for adenopathy. Does not bruise/bleed easily.   Psychiatric/Behavioral: The patient is not nervous/anxious.           Objective:      Vitals:    05/04/22 1118   BP: (P) 120/78   Pulse: (!) (P) 56   SpO2: (P) 99%   Weight: (P) 81.2 kg (179 lb)   Height: (P) 5' 5" (1.651 m)     Physical Exam  Constitutional:       Appearance: Normal appearance.   HENT:      Head: Normocephalic and atraumatic.      Mouth/Throat:      Mouth: Mucous membranes are moist.   Eyes:      Conjunctiva/sclera: Conjunctivae normal.   Pulmonary:      Effort: Pulmonary effort is normal.   Skin:     Findings: Erythema present.          Neurological:      General: No focal deficit present.      Mental Status: She is alert and oriented to person, place, and time.   Psychiatric:         Mood and Affect: Mood normal.         Behavior: Behavior normal.           Assessment:       1. Essential hypertension    2. Low blood potassium    3. Panniculitis    4. Intestinal malabsorption, unspecified type         Plan:       Essential hypertension    Low blood potassium  -     potassium chloride SA (K-DUR,KLOR-CON) 20 MEQ tablet; Take 1 tablet (20 mEq total) by mouth once daily.  Dispense: 90 tablet; Refill: 1    Panniculitis  Wash with dial soap and apply neosporin. " Keep clean and dry  Nystatin powder     Intestinal malabsorption, unspecified type      Follow up in about 6 months (around 11/4/2022) for Annual Physical.

## 2022-06-28 DIAGNOSIS — F43.23 SITUATIONAL MIXED ANXIETY AND DEPRESSIVE DISORDER: ICD-10-CM

## 2022-06-28 RX ORDER — ALPRAZOLAM 0.5 MG/1
0.5 TABLET ORAL 2 TIMES DAILY PRN
Qty: 60 TABLET | Refills: 1 | Status: SHIPPED | OUTPATIENT
Start: 2022-06-28 | End: 2024-03-05

## 2022-09-06 ENCOUNTER — TELEPHONE (OUTPATIENT)
Dept: FAMILY MEDICINE | Facility: CLINIC | Age: 37
End: 2022-09-06

## 2022-09-06 NOTE — TELEPHONE ENCOUNTER
----- Message from Jimena Muse MA sent at 9/2/2022  8:31 AM CDT -----    ----- Message -----  From: Carmela Valadez  Sent: 9/2/2022   8:08 AM CDT  To: [facility] Staff    PRIOR AUTH.

## 2022-11-03 ENCOUNTER — TELEPHONE (OUTPATIENT)
Dept: FAMILY MEDICINE | Facility: CLINIC | Age: 37
End: 2022-11-03

## 2022-11-03 DIAGNOSIS — Z13.6 SCREENING FOR ISCHEMIC HEART DISEASE (IHD): ICD-10-CM

## 2022-11-03 DIAGNOSIS — I10 ESSENTIAL HYPERTENSION: Primary | ICD-10-CM

## 2022-11-03 DIAGNOSIS — E66.01 MORBID OBESITY: ICD-10-CM

## 2022-11-03 NOTE — TELEPHONE ENCOUNTER
Lab orders signed per MD.  Patient notified to complete tomorrow morning fasting, patient verbalized understanding -DN

## 2022-11-03 NOTE — TELEPHONE ENCOUNTER
----- Message from Diego Leonard sent at 11/3/2022  1:01 PM CDT -----  Pt wanted to know if she needed labs done before her appt Monday.  [phone]

## 2022-11-05 LAB
BUN SERPL-MCNC: 12 MG/DL (ref 7–25)
BUN/CREAT SERPL: NORMAL (CALC) (ref 6–22)
CALCIUM SERPL-MCNC: 9.4 MG/DL (ref 8.6–10.2)
CHLORIDE SERPL-SCNC: 105 MMOL/L (ref 98–110)
CHOLEST SERPL-MCNC: 210 MG/DL
CHOLEST/HDLC SERPL: 3.3 (CALC)
CO2 SERPL-SCNC: 26 MMOL/L (ref 20–32)
CREAT SERPL-MCNC: 0.64 MG/DL (ref 0.5–0.97)
EGFR: 117 ML/MIN/1.73M2
GLUCOSE SERPL-MCNC: 85 MG/DL (ref 65–99)
HDLC SERPL-MCNC: 63 MG/DL
LDLC SERPL CALC-MCNC: 131 MG/DL (CALC)
NONHDLC SERPL-MCNC: 147 MG/DL (CALC)
POTASSIUM SERPL-SCNC: 4.5 MMOL/L (ref 3.5–5.3)
SODIUM SERPL-SCNC: 139 MMOL/L (ref 135–146)
TRIGL SERPL-MCNC: 72 MG/DL

## 2022-11-07 ENCOUNTER — OFFICE VISIT (OUTPATIENT)
Dept: FAMILY MEDICINE | Facility: CLINIC | Age: 37
End: 2022-11-07
Payer: COMMERCIAL

## 2022-11-07 VITALS
WEIGHT: 182 LBS | SYSTOLIC BLOOD PRESSURE: 128 MMHG | HEART RATE: 75 BPM | BODY MASS INDEX: 30.32 KG/M2 | DIASTOLIC BLOOD PRESSURE: 84 MMHG | HEIGHT: 65 IN

## 2022-11-07 DIAGNOSIS — E87.6 LOW POTASSIUM SYNDROME: ICD-10-CM

## 2022-11-07 DIAGNOSIS — Z00.00 ANNUAL PHYSICAL EXAM: Primary | ICD-10-CM

## 2022-11-07 DIAGNOSIS — K90.9 INTESTINAL MALABSORPTION, UNSPECIFIED TYPE: ICD-10-CM

## 2022-11-07 DIAGNOSIS — E04.2 MULTINODULAR GOITER: ICD-10-CM

## 2022-11-07 DIAGNOSIS — F43.23 SITUATIONAL MIXED ANXIETY AND DEPRESSIVE DISORDER: ICD-10-CM

## 2022-11-07 DIAGNOSIS — I10 ESSENTIAL HYPERTENSION: ICD-10-CM

## 2022-11-07 DIAGNOSIS — Z23 NEEDS FLU SHOT: ICD-10-CM

## 2022-11-07 PROCEDURE — 1159F PR MEDICATION LIST DOCUMENTED IN MEDICAL RECORD: ICD-10-PCS | Mod: CPTII,S$GLB,, | Performed by: FAMILY MEDICINE

## 2022-11-07 PROCEDURE — 99395 PR PREVENTIVE VISIT,EST,18-39: ICD-10-PCS | Mod: 25,S$GLB,, | Performed by: FAMILY MEDICINE

## 2022-11-07 PROCEDURE — 90682 FLU VACCINE - QUADRIVALENT (RECOMBINANT) PRESERVATIVE FREE: ICD-10-PCS | Mod: S$GLB,,, | Performed by: FAMILY MEDICINE

## 2022-11-07 PROCEDURE — 3008F BODY MASS INDEX DOCD: CPT | Mod: CPTII,S$GLB,, | Performed by: FAMILY MEDICINE

## 2022-11-07 PROCEDURE — 99395 PREV VISIT EST AGE 18-39: CPT | Mod: 25,S$GLB,, | Performed by: FAMILY MEDICINE

## 2022-11-07 PROCEDURE — 3074F SYST BP LT 130 MM HG: CPT | Mod: CPTII,S$GLB,, | Performed by: FAMILY MEDICINE

## 2022-11-07 PROCEDURE — 3079F PR MOST RECENT DIASTOLIC BLOOD PRESSURE 80-89 MM HG: ICD-10-PCS | Mod: CPTII,S$GLB,, | Performed by: FAMILY MEDICINE

## 2022-11-07 PROCEDURE — 3066F NEPHROPATHY DOC TX: CPT | Mod: CPTII,S$GLB,, | Performed by: FAMILY MEDICINE

## 2022-11-07 PROCEDURE — 4010F PR ACE/ARB THEARPY RXD/TAKEN: ICD-10-PCS | Mod: CPTII,S$GLB,, | Performed by: FAMILY MEDICINE

## 2022-11-07 PROCEDURE — 3066F PR DOCUMENTATION OF TREATMENT FOR NEPHROPATHY: ICD-10-PCS | Mod: CPTII,S$GLB,, | Performed by: FAMILY MEDICINE

## 2022-11-07 PROCEDURE — 3074F PR MOST RECENT SYSTOLIC BLOOD PRESSURE < 130 MM HG: ICD-10-PCS | Mod: CPTII,S$GLB,, | Performed by: FAMILY MEDICINE

## 2022-11-07 PROCEDURE — 1160F PR REVIEW ALL MEDS BY PRESCRIBER/CLIN PHARMACIST DOCUMENTED: ICD-10-PCS | Mod: CPTII,S$GLB,, | Performed by: FAMILY MEDICINE

## 2022-11-07 PROCEDURE — 90471 FLU VACCINE - QUADRIVALENT (RECOMBINANT) PRESERVATIVE FREE: ICD-10-PCS | Mod: S$GLB,,, | Performed by: FAMILY MEDICINE

## 2022-11-07 PROCEDURE — 1159F MED LIST DOCD IN RCRD: CPT | Mod: CPTII,S$GLB,, | Performed by: FAMILY MEDICINE

## 2022-11-07 PROCEDURE — 4010F ACE/ARB THERAPY RXD/TAKEN: CPT | Mod: CPTII,S$GLB,, | Performed by: FAMILY MEDICINE

## 2022-11-07 PROCEDURE — 90682 RIV4 VACC RECOMBINANT DNA IM: CPT | Mod: S$GLB,,, | Performed by: FAMILY MEDICINE

## 2022-11-07 PROCEDURE — 3061F NEG MICROALBUMINURIA REV: CPT | Mod: CPTII,S$GLB,, | Performed by: FAMILY MEDICINE

## 2022-11-07 PROCEDURE — 3008F PR BODY MASS INDEX (BMI) DOCUMENTED: ICD-10-PCS | Mod: CPTII,S$GLB,, | Performed by: FAMILY MEDICINE

## 2022-11-07 PROCEDURE — 3061F PR NEG MICROALBUMINURIA RESULT DOCUMENTED/REVIEW: ICD-10-PCS | Mod: CPTII,S$GLB,, | Performed by: FAMILY MEDICINE

## 2022-11-07 PROCEDURE — 90471 IMMUNIZATION ADMIN: CPT | Mod: S$GLB,,, | Performed by: FAMILY MEDICINE

## 2022-11-07 PROCEDURE — 1160F RVW MEDS BY RX/DR IN RCRD: CPT | Mod: CPTII,S$GLB,, | Performed by: FAMILY MEDICINE

## 2022-11-07 PROCEDURE — 3079F DIAST BP 80-89 MM HG: CPT | Mod: CPTII,S$GLB,, | Performed by: FAMILY MEDICINE

## 2022-11-07 RX ORDER — ALPRAZOLAM 0.5 MG/1
0.5 TABLET ORAL 2 TIMES DAILY PRN
Qty: 60 TABLET | Refills: 1 | Status: CANCELLED | OUTPATIENT
Start: 2022-11-07 | End: 2022-12-07

## 2022-11-07 NOTE — PROGRESS NOTES
SUBJECTIVE:   HPI: Nani Orr  is a 37 y.o. female who presents for annual physical .   Annual Exam (No bottles, discuss about Lab-results, Flu vaccine ordered, need refill, abc )    Patient with history of hypertension and hyperlipidemia presents for visit.  She has been doing well since bariatric surgery a little over year ago.  She does continue to take multivitamin supplements.  She watches her diet and has eliminated red meat.  Blood pressure has been well controlled on lower dose of Benicar.  Lipids remain slightly elevated but her HDL has improved.    Metabolic panel normal.  Patient with previous issues with hypokalemia chronically.  This has resolved.  She is up-to-date with well-woman visit.  Flu shot today.  She has received COVID vaccination.  Up-to-date with dental screens.  She reports she feels she is doing well.        Telephone on 11/03/2022   Component Date Value Ref Range Status    Cholesterol 11/04/2022 210 (H)  <200 mg/dL Final    HDL 11/04/2022 63  > OR = 50 mg/dL Final    Triglycerides 11/04/2022 72  <150 mg/dL Final    LDL Cholesterol 11/04/2022 131 (H)  mg/dL (calc) Final    HDL/Cholesterol Ratio 11/04/2022 3.3  <5.0 (calc) Final    Non HDL Chol. (LDL+VLDL) 11/04/2022 147 (H)  <130 mg/dL (calc) Final    Glucose 11/04/2022 85  65 - 99 mg/dL Final    BUN 11/04/2022 12  7 - 25 mg/dL Final    Creatinine 11/04/2022 0.64  0.50 - 0.97 mg/dL Final    eGFR 11/04/2022 117  > OR = 60 mL/min/1.73m2 Final    BUN/Creatinine Ratio 11/04/2022 NOT APPLICABLE  6 - 22 (calc) Final    Sodium 11/04/2022 139  135 - 146 mmol/L Final    Potassium 11/04/2022 4.5  3.5 - 5.3 mmol/L Final    Chloride 11/04/2022 105  98 - 110 mmol/L Final    CO2 11/04/2022 26  20 - 32 mmol/L Final    Calcium 11/04/2022 9.4  8.6 - 10.2 mg/dL Final      (Not in a hospital admission)    Review of patient's allergies indicates:  No Known Allergies  Current Outpatient Medications on File Prior to Visit   Medication Sig Dispense Refill     ALPRAZolam (XANAX) 0.5 MG tablet Take 1 tablet (0.5 mg total) by mouth 2 (two) times daily as needed for Insomnia or Anxiety. 60 tablet 1    olmesartan (BENICAR) 5 MG Tab Take 1 tablet (5 mg total) by mouth once daily. For blood pressure 90 tablet 3    [DISCONTINUED] ANNOVERA 0.15-0.013 mg/24 hour Ring Place 1 Device vaginally every 28 days.      [DISCONTINUED] nystatin (MYCOSTATIN) powder Apply topically 2 (two) times daily. (Patient not taking: Reported on 2022) 60 g 1    [DISCONTINUED] potassium chloride SA (K-DUR,KLOR-CON) 20 MEQ tablet Take 1 tablet (20 mEq total) by mouth once daily. (Patient not taking: Reported on 2022) 90 tablet 1     No current facility-administered medications on file prior to visit.     Past Medical History:   Diagnosis Date    Goiter, non-toxic     Herpes simplex     Hypertension     PCOS (polycystic ovarian syndrome)     Right bundle branch block 2019    S/P bilateral breast reduction      Past Surgical History:   Procedure Laterality Date    BARIATRIC SURGERY  2021    gastric sleeve    BILATERAL TUBAL LIGATION  2022     SECTION  2009     SECTION  2016    EUA; Excision of Anal Polyp  10/15/2018        TOTAL REDUCTION MAMMOPLASTY Bilateral 2021     Family History   Problem Relation Age of Onset    Hypertension Mother     Breast cancer Maternal Aunt     Hypertension Maternal Aunt     Heart disease Maternal Grandmother     Heart disease Paternal Uncle      Social History     Tobacco Use    Smoking status: Never    Smokeless tobacco: Never   Substance Use Topics    Alcohol use: Not Currently    Drug use: No      The patient has no Health Maintenance topics of status Not Due  Immunization History   Administered Date(s) Administered    COVID-19, MRNA, LN-S, PF (MODERNA FULL 0.5 ML DOSE) 2021, 2021    Influenza 2021    Influenza (FLUBLOK) - Quadrivalent - Recombinant - PF *Preferred* (egg allergy)  "11/07/2022    Influenza - Quadrivalent - PF *Preferred* (6 months and older) 10/14/2020       Review of Systems   Constitutional:  Negative for activity change, fatigue and unexpected weight change.   HENT:  Negative for hearing loss, postnasal drip, sinus pressure, sore throat and voice change.    Eyes:  Negative for photophobia and visual disturbance.   Respiratory:  Negative for cough, shortness of breath and wheezing.    Cardiovascular:  Negative for chest pain and palpitations.   Gastrointestinal:  Negative for constipation, diarrhea and nausea.   Genitourinary:  Negative for difficulty urinating, frequency, hematuria and urgency.   Musculoskeletal:  Negative for arthralgias and back pain.   Skin:  Negative for rash.   Neurological:  Negative for weakness, light-headedness and headaches.   Hematological:  Negative for adenopathy. Does not bruise/bleed easily.   Psychiatric/Behavioral:  The patient is not nervous/anxious.     OBJECTIVE:      Vitals:    11/07/22 1043   BP: 128/84   Pulse: 75   Weight: 82.6 kg (182 lb)   Height: 5' 5" (1.651 m)     Physical Exam  Constitutional:       Appearance: Normal appearance.   HENT:      Head: Normocephalic and atraumatic.      Mouth/Throat:      Mouth: Mucous membranes are moist.   Eyes:      Conjunctiva/sclera: Conjunctivae normal.   Cardiovascular:      Rate and Rhythm: Normal rate and regular rhythm.   Pulmonary:      Effort: Pulmonary effort is normal.   Neurological:      General: No focal deficit present.      Mental Status: She is alert and oriented to person, place, and time.   Psychiatric:         Mood and Affect: Mood normal.         Behavior: Behavior normal.      Assessment:       1. Annual physical exam    2. Essential hypertension    3. Multinodular goiter    4. Situational mixed anxiety and depressive disorder    5. Intestinal malabsorption, unspecified type    6. Low potassium syndrome    7. Needs flu shot          Plan:       Annual physical " exam    Essential hypertension  Controlled    Multinodular goiter  Stable    Situational mixed anxiety and depressive disorder  Resolved    Intestinal malabsorption, unspecified type  Continue vitamin supplements    Low potassium syndrome  Resolved    Needs flu shot  -     Influenza (FLUBLOK) - Quadrivalent (Recombinant) *Preferred* (PF) - egg allergy      Counseled on age and gender appropriate medical preventative services, including cancer screenings, immunizations, overall nutritional health, need for a consistent exercise regimen and an overall push towards maintaining a vigorous and active lifestyle.      Follow up in about 9 months (around 8/7/2023) for Annual Physical.

## 2023-01-27 ENCOUNTER — PATIENT MESSAGE (OUTPATIENT)
Dept: FAMILY MEDICINE | Facility: CLINIC | Age: 38
End: 2023-01-27

## 2023-02-13 ENCOUNTER — PATIENT MESSAGE (OUTPATIENT)
Dept: FAMILY MEDICINE | Facility: CLINIC | Age: 38
End: 2023-02-13

## 2023-03-05 ENCOUNTER — PATIENT MESSAGE (OUTPATIENT)
Dept: FAMILY MEDICINE | Facility: CLINIC | Age: 38
End: 2023-03-05

## 2023-04-11 ENCOUNTER — PATIENT MESSAGE (OUTPATIENT)
Dept: ADMINISTRATIVE | Facility: HOSPITAL | Age: 38
End: 2023-04-11

## 2023-08-04 ENCOUNTER — PATIENT MESSAGE (OUTPATIENT)
Dept: FAMILY MEDICINE | Facility: CLINIC | Age: 38
End: 2023-08-04

## 2023-09-07 ENCOUNTER — PATIENT MESSAGE (OUTPATIENT)
Dept: FAMILY MEDICINE | Facility: CLINIC | Age: 38
End: 2023-09-07

## 2023-09-07 DIAGNOSIS — Z79.899 ENCOUNTER FOR LONG-TERM (CURRENT) USE OF OTHER MEDICATIONS: Primary | ICD-10-CM

## 2023-09-07 DIAGNOSIS — Z00.00 ANNUAL PHYSICAL EXAM: ICD-10-CM

## 2023-09-07 DIAGNOSIS — I10 ESSENTIAL HYPERTENSION: ICD-10-CM

## 2023-09-20 LAB
ALBUMIN SERPL-MCNC: 4.2 G/DL (ref 3.6–5.1)
ALBUMIN/CREAT UR: 6 MCG/MG CREAT
ALBUMIN/GLOB SERPL: 1.8 (CALC) (ref 1–2.5)
ALP SERPL-CCNC: 50 U/L (ref 31–125)
ALT SERPL-CCNC: 10 U/L (ref 6–29)
APPEARANCE UR: CLEAR
AST SERPL-CCNC: 16 U/L (ref 10–30)
BACTERIA #/AREA URNS HPF: ABNORMAL /HPF
BACTERIA UR CULT: ABNORMAL
BASOPHILS # BLD AUTO: 31 CELLS/UL (ref 0–200)
BASOPHILS NFR BLD AUTO: 0.7 %
BILIRUB SERPL-MCNC: 0.5 MG/DL (ref 0.2–1.2)
BILIRUB UR QL STRIP: NEGATIVE
BUN SERPL-MCNC: 11 MG/DL (ref 7–25)
BUN/CREAT SERPL: NORMAL (CALC) (ref 6–22)
CALCIUM SERPL-MCNC: 9.1 MG/DL (ref 8.6–10.2)
CHLORIDE SERPL-SCNC: 106 MMOL/L (ref 98–110)
CHOLEST SERPL-MCNC: 207 MG/DL
CHOLEST/HDLC SERPL: 3.1 (CALC)
CO2 SERPL-SCNC: 26 MMOL/L (ref 20–32)
COLOR UR: YELLOW
CREAT SERPL-MCNC: 0.78 MG/DL (ref 0.5–0.97)
CREAT UR-MCNC: 190 MG/DL (ref 20–275)
EGFR: 100 ML/MIN/1.73M2
EOSINOPHIL # BLD AUTO: 317 CELLS/UL (ref 15–500)
EOSINOPHIL NFR BLD AUTO: 7.2 %
ERYTHROCYTE [DISTWIDTH] IN BLOOD BY AUTOMATED COUNT: 11.8 % (ref 11–15)
GLOBULIN SER CALC-MCNC: 2.4 G/DL (CALC) (ref 1.9–3.7)
GLUCOSE SERPL-MCNC: 84 MG/DL (ref 65–99)
GLUCOSE UR QL STRIP: NEGATIVE
HCT VFR BLD AUTO: 39.1 % (ref 35–45)
HDLC SERPL-MCNC: 66 MG/DL
HGB BLD-MCNC: 12.6 G/DL (ref 11.7–15.5)
HGB UR QL STRIP: ABNORMAL
HYALINE CASTS #/AREA URNS LPF: ABNORMAL /LPF
KETONES UR QL STRIP: NEGATIVE
LDLC SERPL CALC-MCNC: 124 MG/DL (CALC)
LEUKOCYTE ESTERASE UR QL STRIP: NEGATIVE
LYMPHOCYTES # BLD AUTO: 1795 CELLS/UL (ref 850–3900)
LYMPHOCYTES NFR BLD AUTO: 40.8 %
MCH RBC QN AUTO: 29.2 PG (ref 27–33)
MCHC RBC AUTO-ENTMCNC: 32.2 G/DL (ref 32–36)
MCV RBC AUTO: 90.5 FL (ref 80–100)
MICROALBUMIN UR-MCNC: 1.1 MG/DL
MONOCYTES # BLD AUTO: 260 CELLS/UL (ref 200–950)
MONOCYTES NFR BLD AUTO: 5.9 %
NEUTROPHILS # BLD AUTO: 1998 CELLS/UL (ref 1500–7800)
NEUTROPHILS NFR BLD AUTO: 45.4 %
NITRITE UR QL STRIP: NEGATIVE
NONHDLC SERPL-MCNC: 141 MG/DL (CALC)
PH UR STRIP: 6.5 [PH] (ref 5–8)
PLATELET # BLD AUTO: 286 THOUSAND/UL (ref 140–400)
PMV BLD REES-ECKER: 9.7 FL (ref 7.5–12.5)
POTASSIUM SERPL-SCNC: 4.2 MMOL/L (ref 3.5–5.3)
PROT SERPL-MCNC: 6.6 G/DL (ref 6.1–8.1)
PROT UR QL STRIP: NEGATIVE
RBC # BLD AUTO: 4.32 MILLION/UL (ref 3.8–5.1)
RBC #/AREA URNS HPF: ABNORMAL /HPF
SERVICE CMNT-IMP: ABNORMAL
SODIUM SERPL-SCNC: 140 MMOL/L (ref 135–146)
SP GR UR STRIP: 1.02 (ref 1–1.03)
SQUAMOUS #/AREA URNS HPF: ABNORMAL /HPF
TRIGL SERPL-MCNC: 80 MG/DL
TSH SERPL-ACNC: 0.62 MIU/L
WBC # BLD AUTO: 4.4 THOUSAND/UL (ref 3.8–10.8)
WBC #/AREA URNS HPF: ABNORMAL /HPF

## 2023-11-05 ENCOUNTER — PATIENT MESSAGE (OUTPATIENT)
Dept: FAMILY MEDICINE | Facility: CLINIC | Age: 38
End: 2023-11-05

## 2023-11-05 DIAGNOSIS — I10 ESSENTIAL HYPERTENSION: ICD-10-CM

## 2023-11-06 RX ORDER — OLMESARTAN MEDOXOMIL 5 MG/1
5 TABLET ORAL DAILY
Qty: 90 TABLET | Refills: 1 | Status: SHIPPED | OUTPATIENT
Start: 2023-11-06 | End: 2024-03-05 | Stop reason: SDUPTHER

## 2024-03-05 ENCOUNTER — OFFICE VISIT (OUTPATIENT)
Dept: FAMILY MEDICINE | Facility: CLINIC | Age: 39
End: 2024-03-05
Payer: COMMERCIAL

## 2024-03-05 ENCOUNTER — PATIENT MESSAGE (OUTPATIENT)
Dept: FAMILY MEDICINE | Facility: CLINIC | Age: 39
End: 2024-03-05
Payer: COMMERCIAL

## 2024-03-05 ENCOUNTER — PATIENT MESSAGE (OUTPATIENT)
Dept: FAMILY MEDICINE | Facility: CLINIC | Age: 39
End: 2024-03-05

## 2024-03-05 VITALS
HEART RATE: 67 BPM | WEIGHT: 188 LBS | SYSTOLIC BLOOD PRESSURE: 124 MMHG | BODY MASS INDEX: 31.32 KG/M2 | HEIGHT: 65 IN | OXYGEN SATURATION: 99 % | DIASTOLIC BLOOD PRESSURE: 80 MMHG

## 2024-03-05 DIAGNOSIS — I10 ESSENTIAL HYPERTENSION: ICD-10-CM

## 2024-03-05 DIAGNOSIS — Z00.01 ENCOUNTER FOR GENERAL ADULT MEDICAL EXAMINATION WITH ABNORMAL FINDINGS: Primary | ICD-10-CM

## 2024-03-05 DIAGNOSIS — E28.2 PCOS (POLYCYSTIC OVARIAN SYNDROME): ICD-10-CM

## 2024-03-05 DIAGNOSIS — F43.23 SITUATIONAL MIXED ANXIETY AND DEPRESSIVE DISORDER: ICD-10-CM

## 2024-03-05 DIAGNOSIS — Z98.84 STATUS POST LAPAROSCOPIC SLEEVE GASTRECTOMY: ICD-10-CM

## 2024-03-05 PROCEDURE — 3079F DIAST BP 80-89 MM HG: CPT | Mod: CPTII,S$GLB,, | Performed by: FAMILY MEDICINE

## 2024-03-05 PROCEDURE — 3074F SYST BP LT 130 MM HG: CPT | Mod: CPTII,S$GLB,, | Performed by: FAMILY MEDICINE

## 2024-03-05 PROCEDURE — 1160F RVW MEDS BY RX/DR IN RCRD: CPT | Mod: CPTII,S$GLB,, | Performed by: FAMILY MEDICINE

## 2024-03-05 PROCEDURE — 4010F ACE/ARB THERAPY RXD/TAKEN: CPT | Mod: CPTII,S$GLB,, | Performed by: FAMILY MEDICINE

## 2024-03-05 PROCEDURE — 1159F MED LIST DOCD IN RCRD: CPT | Mod: CPTII,S$GLB,, | Performed by: FAMILY MEDICINE

## 2024-03-05 PROCEDURE — 3008F BODY MASS INDEX DOCD: CPT | Mod: CPTII,S$GLB,, | Performed by: FAMILY MEDICINE

## 2024-03-05 PROCEDURE — 99395 PREV VISIT EST AGE 18-39: CPT | Mod: S$GLB,,, | Performed by: FAMILY MEDICINE

## 2024-03-05 RX ORDER — OLMESARTAN MEDOXOMIL 5 MG/1
5 TABLET ORAL DAILY
Qty: 90 TABLET | Refills: 1 | Status: SHIPPED | OUTPATIENT
Start: 2024-03-05 | End: 2025-03-05

## 2024-03-05 RX ORDER — BUPROPION HYDROCHLORIDE 150 MG/1
150 TABLET, EXTENDED RELEASE ORAL 2 TIMES DAILY
Qty: 180 TABLET | Refills: 1 | Status: SHIPPED | OUTPATIENT
Start: 2024-03-05 | End: 2025-03-05

## 2024-03-05 NOTE — PROGRESS NOTES
SUBJECTIVE:   HPI: Nani Orr  is a 38 y.o. female who presents for annual physical .   Annual Exam and concerns of increased depression (-therapist currently on leave/-discuss FMLA)    Patient past medical history of hypertension and depression presents for her annual examination.  She also has history of bariatric surgery and continues to take vitamin-D and other supplements.  Recent labs performed showed no significant abnormalities other than borderline cholesterol.  Last well-woman examination January 2023.  Has upcoming appointment with her gyn later this month.  She continues to work out going to the gym routinely     She has been seen a therapist and Psychiatry group for awhile secondary to anxiety and depression.  Reports she was on Wellbutrin 150 XL but medication cause fatigue.  Other than this it did help with her mood but has been out of medication for least 2 months.  Has not seen a therapist in several months either.  She has been having some increased issues with anxiety and depression.  Has been unable to go to work due to crying spells.  Has been out since 3/1. Not on therapy in over 2 months. Waitng for [facility] to contact her.         Patient Message on 09/07/2023   Component Date Value Ref Range Status    WBC 09/19/2023 4.4  3.8 - 10.8 Thousand/uL Final    RBC 09/19/2023 4.32  3.80 - 5.10 Million/uL Final    Hemoglobin 09/19/2023 12.6  11.7 - 15.5 g/dL Final    Hematocrit 09/19/2023 39.1  35.0 - 45.0 % Final    MCV 09/19/2023 90.5  80.0 - 100.0 fL Final    MCH 09/19/2023 29.2  27.0 - 33.0 pg Final    MCHC 09/19/2023 32.2  32.0 - 36.0 g/dL Final    RDW 09/19/2023 11.8  11.0 - 15.0 % Final    Platelets 09/19/2023 286  140 - 400 Thousand/uL Final    MPV 09/19/2023 9.7  7.5 - 12.5 fL Final    Neutrophils, Abs 09/19/2023 1,998  1,500 - 7,800 cells/uL Final    Lymph # 09/19/2023 1,795  850 - 3,900 cells/uL Final    Mono # 09/19/2023 260  200 - 950 cells/uL Final    Eos # 09/19/2023 648 98 - 048  cells/uL Final    Baso # 09/19/2023 31  0 - 200 cells/uL Final    Neutrophils Relative 09/19/2023 45.4  % Final    Lymph % 09/19/2023 40.8  % Final    Mono % 09/19/2023 5.9  % Final    Eosinophil % 09/19/2023 7.2  % Final    Basophil % 09/19/2023 0.7  % Final    Glucose 09/19/2023 84  65 - 99 mg/dL Final    BUN 09/19/2023 11  7 - 25 mg/dL Final    Creatinine 09/19/2023 0.78  0.50 - 0.97 mg/dL Final    eGFR 09/19/2023 100  > OR = 60 mL/min/1.73m2 Final    BUN/Creatinine Ratio 09/19/2023 SEE NOTE:  6 - 22 (calc) Final    Sodium 09/19/2023 140  135 - 146 mmol/L Final    Potassium 09/19/2023 4.2  3.5 - 5.3 mmol/L Final    Chloride 09/19/2023 106  98 - 110 mmol/L Final    CO2 09/19/2023 26  20 - 32 mmol/L Final    Calcium 09/19/2023 9.1  8.6 - 10.2 mg/dL Final    Total Protein 09/19/2023 6.6  6.1 - 8.1 g/dL Final    Albumin 09/19/2023 4.2  3.6 - 5.1 g/dL Final    Globulin, Total 09/19/2023 2.4  1.9 - 3.7 g/dL (calc) Final    Albumin/Globulin Ratio 09/19/2023 1.8  1.0 - 2.5 (calc) Final    Total Bilirubin 09/19/2023 0.5  0.2 - 1.2 mg/dL Final    Alkaline Phosphatase 09/19/2023 50  31 - 125 U/L Final    AST 09/19/2023 16  10 - 30 U/L Final    ALT 09/19/2023 10  6 - 29 U/L Final    Cholesterol 09/19/2023 207 (H)  <200 mg/dL Final    HDL 09/19/2023 66  > OR = 50 mg/dL Final    Triglycerides 09/19/2023 80  <150 mg/dL Final    LDL Cholesterol 09/19/2023 124 (H)  mg/dL (calc) Final    HDL/Cholesterol Ratio 09/19/2023 3.1  <5.0 (calc) Final    Non HDL Chol. (LDL+VLDL) 09/19/2023 141 (H)  <130 mg/dL (calc) Final    Creatinine, Urine 09/19/2023 190  20 - 275 mg/dL Final    Microalb, Ur 09/19/2023 1.1  See Note: mg/dL Final    Microalb/Creat Ratio 09/19/2023 6  <30 mcg/mg creat Final    TSH w/reflex to FT4 09/19/2023 0.62  mIU/L Final    Color, UA 09/19/2023 YELLOW  YELLOW Final    Appearance, UA 09/19/2023 CLEAR  CLEAR Final    Specific Gravity, UA 09/19/2023 1.020  1.001 - 1.035 Final    pH, UA 09/19/2023 6.5  5.0 - 8.0 Final     Glucose, UA 2023 NEGATIVE  NEGATIVE Final    Bilirubin, UA 2023 NEGATIVE  NEGATIVE Final    Ketones, UA 2023 NEGATIVE  NEGATIVE Final    Occult Blood UA 2023 TRACE (A)  NEGATIVE Final    Protein, UA 2023 NEGATIVE  NEGATIVE Final    Nitrite, UA 2023 NEGATIVE  NEGATIVE Final    Leukocytes, UA 2023 NEGATIVE  NEGATIVE Final    WBC Casts, UA 2023 0-5  < OR = 5 /HPF Final    RBC Casts, UA 2023 0-2  < OR = 2 /HPF Final    Squam Epithel, UA 2023 0-5  < OR = 5 /HPF Final    Bacteria, UA 2023 FEW (A)  NONE SEEN /HPF Final    Hyaline Casts, UA 2023 NONE SEEN  NONE SEEN /LPF Final    Service Cmt: 2023    Final    Reflexive Urine Culture 2023    Final      (Not in a hospital admission)    Review of patient's allergies indicates:  No Known Allergies  Current Outpatient Medications on File Prior to Visit   Medication Sig Dispense Refill    [DISCONTINUED] olmesartan (BENICAR) 5 MG Tab Take 1 tablet (5 mg total) by mouth once daily. For blood pressure 90 tablet 1    [DISCONTINUED] ALPRAZolam (XANAX) 0.5 MG tablet Take 1 tablet (0.5 mg total) by mouth 2 (two) times daily as needed for Insomnia or Anxiety. 60 tablet 1     No current facility-administered medications on file prior to visit.     Past Medical History:   Diagnosis Date    Goiter, non-toxic     Herpes simplex     Hypertension     PCOS (polycystic ovarian syndrome)     Right bundle branch block 2019    S/P bilateral breast reduction      Past Surgical History:   Procedure Laterality Date    BARIATRIC SURGERY  2021    gastric sleeve    BILATERAL TUBAL LIGATION  2022     SECTION  2009     SECTION  2016    EUA; Excision of Anal Polyp  10/15/2018        TOTAL REDUCTION MAMMOPLASTY Bilateral 2021     Family History   Problem Relation Age of Onset    Hypertension Mother     Breast cancer Maternal Aunt     Hypertension Maternal Aunt      "Heart disease Maternal Grandmother     Heart disease Paternal Uncle      Social History     Tobacco Use    Smoking status: Never    Smokeless tobacco: Never   Substance Use Topics    Alcohol use: Not Currently    Drug use: No      The patient has no Health Maintenance topics of status Not Due  Immunization History   Administered Date(s) Administered    COVID-19, MRNA, LN-S, PF (MODERNA FULL 0.5 ML DOSE) 07/19/2021, 08/16/2021    Influenza 09/24/2021    Influenza (FLUBLOK) - Quadrivalent - Recombinant - PF *Preferred* (egg allergy) 11/07/2022, 10/09/2023    Influenza - Quadrivalent - PF *Preferred* (6 months and older) 10/14/2020       Review of Systems   Constitutional:  Negative for activity change and unexpected weight change.   HENT:  Negative for hearing loss, rhinorrhea and trouble swallowing.    Eyes:  Negative for discharge and visual disturbance.   Respiratory:  Negative for chest tightness and wheezing.    Cardiovascular:  Negative for chest pain and palpitations.   Gastrointestinal:  Negative for blood in stool, constipation, diarrhea and vomiting.   Endocrine: Negative for polydipsia and polyuria.   Genitourinary:  Negative for difficulty urinating, dysuria, hematuria and menstrual problem.   Musculoskeletal:  Negative for arthralgias, joint swelling and neck pain.   Neurological:  Positive for headaches. Negative for weakness.   Psychiatric/Behavioral:  Positive for dysphoric mood. Negative for confusion.       OBJECTIVE:      Vitals:    03/05/24 1124   BP: 124/80   Pulse: 67   SpO2: 99%   Weight: 85.3 kg (188 lb)   Height: 5' 5" (1.651 m)     Physical Exam  Vitals reviewed.   Constitutional:       General: She is not in acute distress.     Appearance: Normal appearance. She is well-developed.   HENT:      Head: Normocephalic and atraumatic.      Right Ear: External ear normal.      Left Ear: External ear normal.      Nose: Nose normal.      Mouth/Throat:      Mouth: Mucous membranes are moist.   Eyes: "      General: Lids are normal.      Conjunctiva/sclera: Conjunctivae normal.      Pupils: Pupils are equal, round, and reactive to light.   Neck:      Thyroid: No thyromegaly.      Vascular: No JVD.      Trachea: No tracheal deviation.   Cardiovascular:      Rate and Rhythm: Normal rate and regular rhythm.      Chest Wall: PMI is not displaced.      Pulses: Normal pulses.      Heart sounds: Normal heart sounds.   Pulmonary:      Effort: Pulmonary effort is normal.      Breath sounds: Normal breath sounds.   Abdominal:      General: Bowel sounds are normal.      Palpations: Abdomen is soft.      Tenderness: There is no abdominal tenderness. There is no guarding or rebound.   Musculoskeletal:         General: No tenderness. Normal range of motion.      Cervical back: Full passive range of motion without pain and neck supple.   Skin:     General: Skin is warm and dry.      Findings: No rash.   Neurological:      General: No focal deficit present.      Mental Status: She is alert and oriented to person, place, and time.   Psychiatric:         Mood and Affect: Mood normal.         Behavior: Behavior normal.        Assessment:       1. Encounter for general adult medical examination with abnormal findings    2. Essential hypertension    3. Situational mixed anxiety and depressive disorder    4. Status post laparoscopic sleeve gastrectomy    5. PCOS (polycystic ovarian syndrome)        Plan:       Encounter for general adult medical examination with abnormal findings    Essential hypertension  -     olmesartan (BENICAR) 5 MG Tab; Take 1 tablet (5 mg total) by mouth once daily. For blood pressure  Dispense: 90 tablet; Refill: 1    Situational mixed anxiety and depressive disorder  -     buPROPion (WELLBUTRIN SR) 150 MG TBSR 12 hr tablet; Take 1 tablet (150 mg total) by mouth 2 (two) times daily.  Dispense: 180 tablet; Refill: 1    Status post laparoscopic sleeve gastrectomy    PCOS (polycystic ovarian syndrome)    Okay for  2 week absence.  Any other will be care of therapists    Counseled on age and gender appropriate medical preventative services, including cancer screenings, immunizations, overall nutritional health, need for a consistent exercise regimen and an overall push towards maintaining a vigorous and active lifestyle.      Follow up in about 6 months (around 9/5/2024) for HTN, mood .

## 2024-03-14 ENCOUNTER — PATIENT MESSAGE (OUTPATIENT)
Dept: FAMILY MEDICINE | Facility: CLINIC | Age: 39
End: 2024-03-14
Payer: COMMERCIAL

## 2024-03-14 ENCOUNTER — TELEPHONE (OUTPATIENT)
Dept: FAMILY MEDICINE | Facility: CLINIC | Age: 39
End: 2024-03-14
Payer: COMMERCIAL

## 2024-03-14 NOTE — TELEPHONE ENCOUNTER
----- Message from Diego Leonard sent at 3/14/2024  9:27 AM CDT -----  Pt dropped off paperwork for provider. I gave to Efrem  [phone]

## 2024-03-15 ENCOUNTER — TELEPHONE (OUTPATIENT)
Dept: FAMILY MEDICINE | Facility: CLINIC | Age: 39
End: 2024-03-15
Payer: COMMERCIAL

## 2024-03-15 NOTE — TELEPHONE ENCOUNTER
----- Message from Mariah Kearney sent at 3/15/2024 10:58 AM CDT -----  Returning a call to Jimena.  Pt's # [phone] GH

## 2024-03-22 ENCOUNTER — PATIENT MESSAGE (OUTPATIENT)
Dept: FAMILY MEDICINE | Facility: CLINIC | Age: 39
End: 2024-03-22
Payer: COMMERCIAL

## 2024-04-29 ENCOUNTER — PATIENT MESSAGE (OUTPATIENT)
Dept: FAMILY MEDICINE | Facility: CLINIC | Age: 39
End: 2024-04-29
Payer: COMMERCIAL

## 2024-05-01 ENCOUNTER — OFFICE VISIT (OUTPATIENT)
Dept: FAMILY MEDICINE | Facility: CLINIC | Age: 39
End: 2024-05-01
Payer: COMMERCIAL

## 2024-05-01 VITALS
BODY MASS INDEX: 31.65 KG/M2 | SYSTOLIC BLOOD PRESSURE: 136 MMHG | DIASTOLIC BLOOD PRESSURE: 98 MMHG | HEART RATE: 54 BPM | HEIGHT: 65 IN | WEIGHT: 190 LBS | OXYGEN SATURATION: 96 %

## 2024-05-01 DIAGNOSIS — R55 SYNCOPE AND COLLAPSE: Primary | ICD-10-CM

## 2024-05-01 DIAGNOSIS — I10 ESSENTIAL HYPERTENSION: ICD-10-CM

## 2024-05-01 PROCEDURE — 1159F MED LIST DOCD IN RCRD: CPT | Mod: CPTII,S$GLB,, | Performed by: FAMILY MEDICINE

## 2024-05-01 PROCEDURE — 3080F DIAST BP >= 90 MM HG: CPT | Mod: CPTII,S$GLB,, | Performed by: FAMILY MEDICINE

## 2024-05-01 PROCEDURE — 3008F BODY MASS INDEX DOCD: CPT | Mod: CPTII,S$GLB,, | Performed by: FAMILY MEDICINE

## 2024-05-01 PROCEDURE — 99214 OFFICE O/P EST MOD 30 MIN: CPT | Mod: S$GLB,,, | Performed by: FAMILY MEDICINE

## 2024-05-01 PROCEDURE — 4010F ACE/ARB THERAPY RXD/TAKEN: CPT | Mod: CPTII,S$GLB,, | Performed by: FAMILY MEDICINE

## 2024-05-01 PROCEDURE — 3075F SYST BP GE 130 - 139MM HG: CPT | Mod: CPTII,S$GLB,, | Performed by: FAMILY MEDICINE

## 2024-05-01 NOTE — PROGRESS NOTES
SUBJECTIVE:    Patient ID: Nani Orr is a 38 y.o. female.    Chief Complaint: Passed Out  (-Elevated Blood Pressure/-All Other Testing OK)    Patient had a syncopal episode while out at the [employer] on  .  And had been about her regular day.  Did consume extra carbohydrates that day to accommodate for being out.  They was not excessively hot.  She had also drank water.  Patient did not feel sick and nauseated prior to episode.  She just remembers waking up after getting a finger prick for her blood sugar.  Evaluated by medical.  Blood sugar and blood pressure were normal.  EKG was unchanged from her previous.  She felt fine after the episode with the exception of a bruise to her leg where she fell.  Did not hit her head as a friend caught her.    She has had a dull headache since this time.  She has continued with her usual eating habits but has only been doing light cardio not her usual workouts.  Continues on her usual medications.  Blood pressure is usually normal.  Has not felt lightheaded or dizzy at any point.    Patient does weightlifting and cardio  5 days a week.  Has noted a slower resting heart rate since this time.  Did check her smart watch for any irregularities at the time of her syncopal episode and no abnormalities were noted. Resting HR generally in the 50s for the past 6 months         Past Medical History:   Diagnosis Date    Goiter, non-toxic     Herpes simplex     Hypertension     PCOS (polycystic ovarian syndrome)     Right bundle branch block 2019    S/P bilateral breast reduction      Past Surgical History:   Procedure Laterality Date    BARIATRIC SURGERY  2021    gastric sleeve    BILATERAL TUBAL LIGATION  2022     SECTION  2009     SECTION  2016    EUA; Excision of Anal Polyp  10/15/2018        TOTAL REDUCTION MAMMOPLASTY Bilateral 2021     Family History   Problem Relation Name Age of Onset    Hypertension Mother  "     Breast cancer Maternal Aunt      Hypertension Maternal Aunt      Heart disease Maternal Grandmother      Heart disease Paternal Uncle         Marital Status: Single  Alcohol History:  reports that she does not currently use alcohol.  Tobacco History:  reports that she has never smoked. She has never used smokeless tobacco.  Drug History:  reports no history of drug use.    Review of patient's allergies indicates:  No Known Allergies    Current Outpatient Medications:     buPROPion (WELLBUTRIN SR) 150 MG TBSR 12 hr tablet, Take 1 tablet (150 mg total) by mouth 2 (two) times daily., Disp: 180 tablet, Rfl: 1    olmesartan (BENICAR) 5 MG Tab, Take 1 tablet (5 mg total) by mouth once daily. For blood pressure, Disp: 90 tablet, Rfl: 1    Review of Systems   Constitutional:  Negative for activity change and unexpected weight change.   HENT:  Negative for hearing loss, rhinorrhea and trouble swallowing.    Eyes:  Negative for discharge and visual disturbance.   Respiratory:  Negative for chest tightness and wheezing.    Cardiovascular:  Negative for chest pain and palpitations.   Gastrointestinal:  Negative for blood in stool, constipation, diarrhea and vomiting.   Endocrine: Negative for polydipsia and polyuria.   Genitourinary:  Negative for difficulty urinating, dysuria, hematuria and menstrual problem.   Musculoskeletal:  Negative for arthralgias, joint swelling and neck pain.   Neurological:  Positive for headaches. Negative for weakness.   Psychiatric/Behavioral:  Positive for dysphoric mood. Negative for confusion.           Objective:          3/5/2024    11:24 AM 5/1/2024    10:27 AM   Vitals - 1 value per visit   SYSTOLIC 124 136   DIASTOLIC 80 98   Pulse 67 54   SPO2 99 % 96 %   Weight (lb) 188 190   Weight (kg) 85.276 86.183   Height 5' 5" (1.651 m) 5' 5" (1.651 m)   BMI (Calculated) 31.3 31.6      Physical Exam  Vitals reviewed.   Constitutional:       General: She is not in acute distress.     Appearance: " Normal appearance. She is well-developed.   HENT:      Head: Normocephalic and atraumatic.      Right Ear: External ear normal.      Left Ear: External ear normal.      Nose: Nose normal.      Mouth/Throat:      Mouth: Mucous membranes are moist.   Eyes:      General: Lids are normal.      Conjunctiva/sclera: Conjunctivae normal.      Pupils: Pupils are equal, round, and reactive to light.   Neck:      Thyroid: No thyromegaly.      Vascular: No JVD.      Trachea: No tracheal deviation.   Cardiovascular:      Rate and Rhythm: Normal rate and regular rhythm.      Chest Wall: PMI is not displaced.      Pulses: Normal pulses.      Heart sounds: Normal heart sounds.   Pulmonary:      Effort: Pulmonary effort is normal.      Breath sounds: Normal breath sounds.   Abdominal:      General: Bowel sounds are normal.      Palpations: Abdomen is soft.      Tenderness: There is no abdominal tenderness. There is no guarding or rebound.   Musculoskeletal:         General: No tenderness. Normal range of motion.      Cervical back: Full passive range of motion without pain and neck supple.   Skin:     General: Skin is warm and dry.      Findings: No rash.   Neurological:      General: No focal deficit present.      Mental Status: She is alert and oriented to person, place, and time.      Cranial Nerves: No cranial nerve deficit.      Motor: No weakness.      Gait: Gait normal.   Psychiatric:         Mood and Affect: Mood normal.         Behavior: Behavior normal.           Assessment:       1. Syncope and collapse    2. Essential hypertension         Plan:       Syncope and collapse  -     Comprehensive Metabolic Panel; Future; Expected date: 05/01/2024  -     CBC Auto Differential; Future; Expected date: 05/01/2024    Essential hypertension      Medication List with Changes/Refills   Current Medications    BUPROPION (WELLBUTRIN SR) 150 MG TBSR 12 HR TABLET    Take 1 tablet (150 mg total) by mouth 2 (two) times daily.    OLMESARTAN  (BENICAR) 5 MG TAB    Take 1 tablet (5 mg total) by mouth once daily. For blood pressure      Follow up keep current.

## 2024-05-02 LAB
ALBUMIN SERPL-MCNC: 4.4 G/DL (ref 3.6–5.1)
ALBUMIN/GLOB SERPL: 1.7 (CALC) (ref 1–2.5)
ALP SERPL-CCNC: 56 U/L (ref 31–125)
ALT SERPL-CCNC: 15 U/L (ref 6–29)
AST SERPL-CCNC: 19 U/L (ref 10–30)
BASOPHILS # BLD AUTO: 47 CELLS/UL (ref 0–200)
BASOPHILS NFR BLD AUTO: 0.8 %
BILIRUB SERPL-MCNC: 0.5 MG/DL (ref 0.2–1.2)
BUN SERPL-MCNC: 15 MG/DL (ref 7–25)
BUN/CREAT SERPL: NORMAL (CALC) (ref 6–22)
CALCIUM SERPL-MCNC: 9.5 MG/DL (ref 8.6–10.2)
CHLORIDE SERPL-SCNC: 105 MMOL/L (ref 98–110)
CO2 SERPL-SCNC: 25 MMOL/L (ref 20–32)
CREAT SERPL-MCNC: 0.72 MG/DL (ref 0.5–0.97)
EGFR: 110 ML/MIN/1.73M2
EOSINOPHIL # BLD AUTO: 448 CELLS/UL (ref 15–500)
EOSINOPHIL NFR BLD AUTO: 7.6 %
ERYTHROCYTE [DISTWIDTH] IN BLOOD BY AUTOMATED COUNT: 12.4 % (ref 11–15)
GLOBULIN SER CALC-MCNC: 2.6 G/DL (CALC) (ref 1.9–3.7)
GLUCOSE SERPL-MCNC: 84 MG/DL (ref 65–99)
HCT VFR BLD AUTO: 37.4 % (ref 35–45)
HGB BLD-MCNC: 11.5 G/DL (ref 11.7–15.5)
LYMPHOCYTES # BLD AUTO: 2289 CELLS/UL (ref 850–3900)
LYMPHOCYTES NFR BLD AUTO: 38.8 %
MCH RBC QN AUTO: 26.3 PG (ref 27–33)
MCHC RBC AUTO-ENTMCNC: 30.7 G/DL (ref 32–36)
MCV RBC AUTO: 85.6 FL (ref 80–100)
MONOCYTES # BLD AUTO: 401 CELLS/UL (ref 200–950)
MONOCYTES NFR BLD AUTO: 6.8 %
NEUTROPHILS # BLD AUTO: 2714 CELLS/UL (ref 1500–7800)
NEUTROPHILS NFR BLD AUTO: 46 %
PLATELET # BLD AUTO: 359 THOUSAND/UL (ref 140–400)
PMV BLD REES-ECKER: 9.7 FL (ref 7.5–12.5)
POTASSIUM SERPL-SCNC: 4.3 MMOL/L (ref 3.5–5.3)
PROT SERPL-MCNC: 7 G/DL (ref 6.1–8.1)
RBC # BLD AUTO: 4.37 MILLION/UL (ref 3.8–5.1)
SODIUM SERPL-SCNC: 141 MMOL/L (ref 135–146)
WBC # BLD AUTO: 5.9 THOUSAND/UL (ref 3.8–10.8)

## 2024-05-08 ENCOUNTER — PATIENT MESSAGE (OUTPATIENT)
Dept: FAMILY MEDICINE | Facility: CLINIC | Age: 39
End: 2024-05-08
Payer: COMMERCIAL

## 2024-09-11 ENCOUNTER — PATIENT OUTREACH (OUTPATIENT)
Dept: ADMINISTRATIVE | Facility: HOSPITAL | Age: 39
End: 2024-09-11
Payer: COMMERCIAL

## 2024-09-11 NOTE — PROGRESS NOTES
Population Health Chart Review & Patient Outreach Details      Additional [facility] Health Notes:               Updates Requested / Reviewed:      Updated Care Coordination Note and Immunizations Reconciliation Completed or Queried: [place]         Health Maintenance Topics Overdue:      VB Score: 1     Uncontrolled BP                       Health Maintenance Topic(s) Outreach Outcomes & Actions Taken:    Blood Pressure - Outreach Outcomes & Actions Taken  : Pt portal message sent.

## 2024-10-16 DIAGNOSIS — I10 ESSENTIAL HYPERTENSION: ICD-10-CM

## 2024-10-16 RX ORDER — OLMESARTAN MEDOXOMIL 5 MG/1
5 TABLET ORAL DAILY
Qty: 90 TABLET | Refills: 1 | Status: SHIPPED | OUTPATIENT
Start: 2024-10-16 | End: 2025-10-16

## 2024-10-16 NOTE — TELEPHONE ENCOUNTER
prescription sent to     [facility]/pharmacy #[id number] - [place] - [address]  [address] [address]  Phone: [phone] Fax: [phone]

## 2024-11-05 ENCOUNTER — OCCUPATIONAL HEALTH (OUTPATIENT)
Dept: URGENT CARE | Facility: CLINIC | Age: 39
End: 2024-11-05

## 2024-11-05 PROCEDURE — 80305 DRUG TEST PRSMV DIR OPT OBS: CPT | Mod: S$GLB,,, | Performed by: EMERGENCY MEDICINE

## 2025-03-10 ENCOUNTER — PATIENT MESSAGE (OUTPATIENT)
Dept: ADMINISTRATIVE | Facility: HOSPITAL | Age: 40
End: 2025-03-10
Payer: COMMERCIAL

## 2025-03-13 ENCOUNTER — PATIENT OUTREACH (OUTPATIENT)
Dept: ADMINISTRATIVE | Facility: HOSPITAL | Age: 40
End: 2025-03-13
Payer: COMMERCIAL

## 2025-03-13 NOTE — PROGRESS NOTES
Population Health Chart Review & Patient Outreach Details      Additional [facility] Health Notes:               Updates Requested / Reviewed:      Updated Care Coordination Note, Care Everywhere, and Immunizations Reconciliation Completed or Queried: [place]         Health Maintenance Topics Overdue:      VB Score: 1     Uncontrolled BP                       Health Maintenance Topic(s) Outreach Outcomes & Actions Taken:    Blood Pressure - Outreach Outcomes & Actions Taken  : Patient Will Call Back or Send Portal Message with Reading

## 2025-03-18 ENCOUNTER — PATIENT MESSAGE (OUTPATIENT)
Dept: ADMINISTRATIVE | Facility: HOSPITAL | Age: 40
End: 2025-03-18
Payer: COMMERCIAL

## 2025-03-18 ENCOUNTER — PATIENT OUTREACH (OUTPATIENT)
Dept: ADMINISTRATIVE | Facility: HOSPITAL | Age: 40
End: 2025-03-18
Payer: COMMERCIAL

## 2025-03-18 VITALS — DIASTOLIC BLOOD PRESSURE: 29 MMHG | SYSTOLIC BLOOD PRESSURE: 138 MMHG

## 2025-03-18 NOTE — PROGRESS NOTES
Patient reported at home BP readings via portal message. Message sent to patient confirming low diastolic number.  Requested they submit a second reading.

## 2025-03-19 ENCOUNTER — PATIENT OUTREACH (OUTPATIENT)
Dept: ADMINISTRATIVE | Facility: HOSPITAL | Age: 40
End: 2025-03-19
Payer: COMMERCIAL

## 2025-03-19 VITALS — SYSTOLIC BLOOD PRESSURE: 138 MMHG | DIASTOLIC BLOOD PRESSURE: 89 MMHG

## 2025-03-20 ENCOUNTER — TELEPHONE (OUTPATIENT)
Dept: FAMILY MEDICINE | Facility: CLINIC | Age: 40
End: 2025-03-20
Payer: COMMERCIAL

## 2025-03-20 NOTE — TELEPHONE ENCOUNTER
BP elevated at last OV. Also due for appt. Spoke with pt states she can't schedule appt right now, but will call back later to schedule.

## 2025-04-02 ENCOUNTER — OFFICE VISIT (OUTPATIENT)
Dept: FAMILY MEDICINE | Facility: CLINIC | Age: 40
End: 2025-04-02
Payer: COMMERCIAL

## 2025-04-02 VITALS
SYSTOLIC BLOOD PRESSURE: 148 MMHG | BODY MASS INDEX: 31.16 KG/M2 | OXYGEN SATURATION: 99 % | HEIGHT: 65 IN | DIASTOLIC BLOOD PRESSURE: 100 MMHG | WEIGHT: 187 LBS | HEART RATE: 74 BPM

## 2025-04-02 DIAGNOSIS — Z98.84 STATUS POST LAPAROSCOPIC SLEEVE GASTRECTOMY: ICD-10-CM

## 2025-04-02 DIAGNOSIS — Z00.00 ANNUAL PHYSICAL EXAM: Primary | ICD-10-CM

## 2025-04-02 DIAGNOSIS — I10 ESSENTIAL HYPERTENSION: ICD-10-CM

## 2025-04-02 DIAGNOSIS — K90.9 INTESTINAL MALABSORPTION, UNSPECIFIED TYPE: ICD-10-CM

## 2025-04-02 RX ORDER — OLMESARTAN MEDOXOMIL 20 MG/1
20 TABLET ORAL DAILY
Qty: 90 TABLET | Refills: 1 | Status: SHIPPED | OUTPATIENT
Start: 2025-04-02 | End: 2026-04-02

## 2025-04-02 NOTE — PROGRESS NOTES
SUBJECTIVE:   HPI: Nani Orr  is a 39 y.o. female who presents for annual physical .   Wellness Exam (-Flu  Covid  Tdap Vaccinations Due)    History of Present Illness    CHIEF COMPLAINT:  Patient presents today for annual exam with hypertension    HYPERTENSION:  She reports home blood pressure readings ranging between 138-189. Her blood pressure was higher before weight loss, after which it temporarily stabilized allowing maintenance of a lower medication dose.    SURGICAL HISTORY:  She has history of bariatric surgery and remains compliant with prescribed post-bariatric surgery supplements.    GYNECOLOGIC:  She reports increased fatigue during menstrual cycle. Pap smear was completed last week.    PREVENTIVE CARE:  She denies receiving flu vaccine this year.       No visits with results within 6 Month(s) from this visit.   Latest known visit with results is:   Office Visit on 05/01/2024   Component Date Value Ref Range Status    Glucose 05/01/2024 84  65 - 99 mg/dL Final    BUN 05/01/2024 15  7 - 25 mg/dL Final    Creatinine 05/01/2024 0.72  0.50 - 0.97 mg/dL Final    eGFR 05/01/2024 110  > OR = 60 mL/min/1.73m2 Final    BUN/Creatinine Ratio 05/01/2024 SEE NOTE:  6 - 22 (calc) Final    Sodium 05/01/2024 141  135 - 146 mmol/L Final    Potassium 05/01/2024 4.3  3.5 - 5.3 mmol/L Final    Chloride 05/01/2024 105  98 - 110 mmol/L Final    CO2 05/01/2024 25  20 - 32 mmol/L Final    Calcium 05/01/2024 9.5  8.6 - 10.2 mg/dL Final    Total Protein 05/01/2024 7.0  6.1 - 8.1 g/dL Final    Albumin 05/01/2024 4.4  3.6 - 5.1 g/dL Final    Globulin, Total 05/01/2024 2.6  1.9 - 3.7 g/dL (calc) Final    Albumin/Globulin Ratio 05/01/2024 1.7  1.0 - 2.5 (calc) Final    Total Bilirubin 05/01/2024 0.5  0.2 - 1.2 mg/dL Final    Alkaline Phosphatase 05/01/2024 56  31 - 125 U/L Final    AST 05/01/2024 19  10 - 30 U/L Final    ALT 05/01/2024 15  6 - 29 U/L Final    WBC 05/01/2024 5.9  3.8 - 10.8 Thousand/uL Final    RBC  2024 4.37  3.80 - 5.10 Million/uL Final    Hemoglobin 2024 11.5 (L)  11.7 - 15.5 g/dL Final    Hematocrit 2024 37.4  35.0 - 45.0 % Final    MCV 2024 85.6  80.0 - 100.0 fL Final    MCH 2024 26.3 (L)  27.0 - 33.0 pg Final    MCHC 2024 30.7 (L)  32.0 - 36.0 g/dL Final    RDW 2024 12.4  11.0 - 15.0 % Final    Platelets 2024 359  140 - 400 Thousand/uL Final    MPV 2024 9.7  7.5 - 12.5 fL Final    Neutrophils, Abs 2024 2,714  1,500 - 7,800 cells/uL Final    Lymph # 2024 2,289  850 - 3,900 cells/uL Final    Mono # 2024 401  200 - 950 cells/uL Final    Eos # 2024 448  15 - 500 cells/uL Final    Baso # 2024 47  0 - 200 cells/uL Final    Neutrophils Relative 2024 46  % Final    Lymph % 2024 38.8  % Final    Mono % 2024 6.8  % Final    Eosinophil % 2024 7.6  % Final    Basophil % 2024 0.8  % Final      (Not in a hospital admission)    Review of patient's allergies indicates:  No Known Allergies  Medications Ordered Prior to Encounter[1]  Past Medical History:   Diagnosis Date    Goiter, non-toxic     Herpes simplex     Hypertension     PCOS (polycystic ovarian syndrome)     Right bundle branch block 2019    S/P bilateral breast reduction      Past Surgical History:   Procedure Laterality Date    BARIATRIC SURGERY  2021    gastric sleeve    BILATERAL TUBAL LIGATION  2022     SECTION  2009     SECTION  2016    EUA; Excision of Anal Polyp  10/15/2018        TOTAL REDUCTION MAMMOPLASTY Bilateral 2021     Family History   Problem Relation Name Age of Onset    Hypertension Mother      Breast cancer Maternal Aunt      Hypertension Maternal Aunt      Heart disease Maternal Grandmother      Heart disease Paternal Uncle       Social History[2]   Health Maintenance Topics with due status: Not Due       Topic Last Completion Date    Hemoglobin A1c (Diabetic Prevention  "Screening) 08/22/2023    Cervical Cancer Screening 03/28/2025    RSV Vaccine (Age 60+ and Pregnant patients) Not Due     Immunization History   Administered Date(s) Administered    COVID-19, MRNA, LN-S, PF (MODERNA FULL 0.5 ML DOSE) 07/19/2021, 08/16/2021    Influenza 09/24/2021    Influenza (FLUBLOK) - Quadrivalent - Recombinant - PF *Preferred* (egg allergy) 11/07/2022, 10/09/2023    Influenza - Quadrivalent - MDCK - PF 09/24/2021    Influenza - Quadrivalent - PF *Preferred* (6 months and older) 10/14/2020       Review of Systems   Constitutional:  Negative for activity change and unexpected weight change.   HENT:  Negative for hearing loss, rhinorrhea and trouble swallowing.    Eyes:  Negative for discharge and visual disturbance.   Respiratory:  Negative for chest tightness and wheezing.    Cardiovascular:  Negative for chest pain and palpitations.   Gastrointestinal:  Negative for blood in stool, constipation, diarrhea and vomiting.   Endocrine: Negative for polydipsia and polyuria.   Genitourinary:  Negative for difficulty urinating, dysuria, hematuria and menstrual problem.   Musculoskeletal:  Negative for arthralgias, joint swelling and neck pain.   Neurological:  Negative for weakness and headaches.   Psychiatric/Behavioral:  Negative for confusion and dysphoric mood.       OBJECTIVE:          3/19/2025     7:38 AM 4/2/2025    11:04 AM   Vitals - 1 value per visit   SYSTOLIC 138 148   DIASTOLIC 89 100   Pulse  74   SPO2  99 %   Weight (lb)  187   Weight (kg)  84.823   Height  5' 5" (1.651 m)   BMI (Calculated)  31.1      Physical Exam  Constitutional:       Appearance: Normal appearance.   HENT:      Head: Normocephalic and atraumatic.      Mouth/Throat:      Mouth: Mucous membranes are moist.   Eyes:      Conjunctiva/sclera: Conjunctivae normal.   Pulmonary:      Effort: Pulmonary effort is normal.   Neurological:      General: No focal deficit present.      Mental Status: She is alert and oriented to " person, place, and time.   Psychiatric:         Mood and Affect: Mood normal.         Behavior: Behavior normal.          Assessment:       Assessment & Plan      IMPRESSION:    ANNUAL EXAM  - BP readings of 138-189 range are elevated.  - Increased antihypertensive medication dosage due to elevated BP, despite previous weight loss.  - Considered hereditary factors in HTN management.  - Evaluated overall health status post-bariatric surgery, including nutritional needs and supplement adherence.  - Reviewed current stress levels and mental health, noting improvement.  - Weight stability despite reduced exercise, indicating appropriate dietary management.    HYPERTENSION:  - Increased Benicar dosage from 5 mg to 20 mg daily.  - Noted that the patient's blood pressure has been ranging from 138 to 189 at home.  - Evaluated that the blood pressure is too high, even after weight loss.  - Assessed that the blood pressure medication dosage needs to be increased.  - Ordered labs to check liver and kidney function due to high blood pressure.  - Explained to the patient that obesity plays a role in hypertension but is not the sole factor.  - Discussed common pattern of blood pressure changes post-bariatric surgery, including initial decrease followed by potential elevation.    OBESITY:  - Noted that the patient's weight has been consistent despite decreased exercise.  - Explained to the patient that obesity plays a role in hypertension but is not the sole factor.      BARIATRIC SURGERY STATUS:AND MALABSORPTION  - Confirmed that the patient is taking supplements post-bariatric surgery.  - Ordered labs to check for vitamin and mineral deficiencies post-bariatric surgery.  - Noted history of sleeve gastrectomy and intestinal malabsorption in assessment.    FOLLOW-UP:  - Scheduled follow up in 6 months for blood pressure check.  - Advised the patient to follow up for next annual exam after the 6-month follow-up if blood pressure is  controlled.           Plan:       Annual physical exam  -     Vitamin D; Future; Expected date: 04/02/2025  -     Vitamin B12; Future; Expected date: 04/02/2025  -     Iron and TIBC; Future; Expected date: 04/02/2025  -     CBC Auto Differential; Future; Expected date: 04/02/2025  -     Comprehensive Metabolic Panel; Future; Expected date: 04/02/2025    Essential hypertension  -     olmesartan (BENICAR) 20 MG tablet; Take 1 tablet (20 mg total) by mouth once daily. For blood pressure  Dispense: 90 tablet; Refill: 1  -     CBC Auto Differential; Future; Expected date: 04/02/2025  -     Comprehensive Metabolic Panel; Future; Expected date: 04/02/2025    Status post laparoscopic sleeve gastrectomy    Intestinal malabsorption, unspecified type  -     Vitamin D; Future; Expected date: 04/02/2025  -     Vitamin B12; Future; Expected date: 04/02/2025  -     Iron and TIBC; Future; Expected date: 04/02/2025        Counseled on age and gender appropriate medical preventative services, including cancer screenings, immunizations, overall nutritional health, need for a consistent exercise regimen and an overall push towards maintaining a vigorous and active lifestyle.      Follow up in about 6 months (around 10/2/2025) for HTN.        This note was generated with the assistance of ambient listening technology. Verbal consent was obtained by the patient and accompanying visitor(s) for the recording of patient appointment to facilitate this note. I attest to having reviewed and edited the generated note for accuracy, though some syntax or spelling errors may persist. Please contact the author of this note for any clarification.                   [1]   Current Outpatient Medications on File Prior to Visit   Medication Sig Dispense Refill    [DISCONTINUED] olmesartan (BENICAR) 5 MG Tab Take 1 tablet (5 mg total) by mouth once daily. For blood pressure 90 tablet 1    [DISCONTINUED] buPROPion (WELLBUTRIN SR) 150 MG TBSR 12 hr tablet  Take 1 tablet (150 mg total) by mouth 2 (two) times daily. 180 tablet 1     No current facility-administered medications on file prior to visit.   [2]   Social History  Tobacco Use    Smoking status: Never    Smokeless tobacco: Never   Substance Use Topics    Alcohol use: Not Currently    Drug use: No

## 2025-04-03 LAB
25(OH)D3+25(OH)D2 SERPL-MCNC: 38 NG/ML (ref 30–100)
ALBUMIN SERPL-MCNC: 4.4 G/DL (ref 3.6–5.1)
ALBUMIN/GLOB SERPL: 1.6 (CALC) (ref 1–2.5)
ALP SERPL-CCNC: 60 U/L (ref 31–125)
ALT SERPL-CCNC: 11 U/L (ref 6–29)
AST SERPL-CCNC: 16 U/L (ref 10–30)
BASOPHILS # BLD AUTO: 48 CELLS/UL (ref 0–200)
BASOPHILS NFR BLD AUTO: 0.8 %
BILIRUB SERPL-MCNC: 0.4 MG/DL (ref 0.2–1.2)
BUN SERPL-MCNC: 15 MG/DL (ref 7–25)
BUN/CREAT SERPL: NORMAL (CALC) (ref 6–22)
CALCIUM SERPL-MCNC: 9.9 MG/DL (ref 8.6–10.2)
CHLORIDE SERPL-SCNC: 106 MMOL/L (ref 98–110)
CO2 SERPL-SCNC: 25 MMOL/L (ref 20–32)
CREAT SERPL-MCNC: 0.74 MG/DL (ref 0.5–0.97)
EGFR: 106 ML/MIN/1.73M2
EOSINOPHIL # BLD AUTO: 300 CELLS/UL (ref 15–500)
EOSINOPHIL NFR BLD AUTO: 5 %
ERYTHROCYTE [DISTWIDTH] IN BLOOD BY AUTOMATED COUNT: 12.6 % (ref 11–15)
GLOBULIN SER CALC-MCNC: 2.7 G/DL (CALC) (ref 1.9–3.7)
GLUCOSE SERPL-MCNC: 78 MG/DL (ref 65–99)
HCT VFR BLD AUTO: 41.4 % (ref 35–45)
HGB BLD-MCNC: 12.9 G/DL (ref 11.7–15.5)
IRON SATN MFR SERPL: 27 % (CALC) (ref 16–45)
IRON SERPL-MCNC: 108 MCG/DL (ref 40–190)
LYMPHOCYTES # BLD AUTO: 2196 CELLS/UL (ref 850–3900)
LYMPHOCYTES NFR BLD AUTO: 36.6 %
MCH RBC QN AUTO: 27.4 PG (ref 27–33)
MCHC RBC AUTO-ENTMCNC: 31.2 G/DL (ref 32–36)
MCV RBC AUTO: 87.9 FL (ref 80–100)
MONOCYTES # BLD AUTO: 360 CELLS/UL (ref 200–950)
MONOCYTES NFR BLD AUTO: 6 %
NEUTROPHILS # BLD AUTO: 3096 CELLS/UL (ref 1500–7800)
NEUTROPHILS NFR BLD AUTO: 51.6 %
PLATELET # BLD AUTO: 328 THOUSAND/UL (ref 140–400)
PMV BLD REES-ECKER: 10.1 FL (ref 7.5–12.5)
POTASSIUM SERPL-SCNC: 4.8 MMOL/L (ref 3.5–5.3)
PROT SERPL-MCNC: 7.1 G/DL (ref 6.1–8.1)
RBC # BLD AUTO: 4.71 MILLION/UL (ref 3.8–5.1)
SODIUM SERPL-SCNC: 141 MMOL/L (ref 135–146)
TIBC SERPL-MCNC: 406 MCG/DL (CALC) (ref 250–450)
VIT B12 SERPL-MCNC: 701 PG/ML (ref 200–1100)
WBC # BLD AUTO: 6 THOUSAND/UL (ref 3.8–10.8)

## 2025-04-09 ENCOUNTER — TELEPHONE (OUTPATIENT)
Dept: FAMILY MEDICINE | Facility: CLINIC | Age: 40
End: 2025-04-09
Payer: COMMERCIAL

## 2025-04-14 ENCOUNTER — PATIENT MESSAGE (OUTPATIENT)
Dept: FAMILY MEDICINE | Facility: CLINIC | Age: 40
End: 2025-04-14
Payer: COMMERCIAL

## 2025-04-14 ENCOUNTER — HOSPITAL ENCOUNTER (EMERGENCY)
Facility: HOSPITAL | Age: 40
Discharge: HOME | End: 2025-04-14
Attending: STUDENT IN AN ORGANIZED HEALTH CARE EDUCATION/TRAINING PROGRAM
Payer: COMMERCIAL

## 2025-04-14 VITALS
SYSTOLIC BLOOD PRESSURE: 141 MMHG | DIASTOLIC BLOOD PRESSURE: 90 MMHG | BODY MASS INDEX: 31.82 KG/M2 | OXYGEN SATURATION: 100 % | TEMPERATURE: 98 F | HEIGHT: 65 IN | WEIGHT: 191 LBS | HEART RATE: 55 BPM | RESPIRATION RATE: 20 BRPM

## 2025-04-14 DIAGNOSIS — R00.1 BRADYCARDIA: ICD-10-CM

## 2025-04-14 DIAGNOSIS — I10 HYPERTENSION, UNSPECIFIED TYPE: Primary | ICD-10-CM

## 2025-04-14 DIAGNOSIS — I10 ESSENTIAL HYPERTENSION: ICD-10-CM

## 2025-04-14 LAB
ABSOLUTE EOSINOPHIL (SMH): 0.36 K/UL
ABSOLUTE MONOCYTE (SMH): 0.42 K/UL (ref 0.3–1)
ABSOLUTE NEUTROPHIL COUNT (SMH): 3.9 K/UL (ref 1.8–7.7)
ALBUMIN SERPL-MCNC: 4.4 G/DL (ref 3.5–5.2)
ALP SERPL-CCNC: 50 UNIT/L (ref 55–135)
ALT SERPL-CCNC: 12 UNIT/L (ref 10–44)
ANION GAP (SMH): 8 MMOL/L (ref 8–16)
AST SERPL-CCNC: 17 UNIT/L (ref 10–40)
BASOPHILS # BLD AUTO: 0.07 K/UL
BASOPHILS NFR BLD AUTO: 0.8 %
BILIRUB SERPL-MCNC: 0.4 MG/DL (ref 0.1–1)
BNP SERPL-MCNC: 40 PG/ML
BUN SERPL-MCNC: 12 MG/DL (ref 6–20)
CALCIUM SERPL-MCNC: 9.4 MG/DL (ref 8.7–10.5)
CHLORIDE SERPL-SCNC: 107 MMOL/L (ref 95–110)
CO2 SERPL-SCNC: 22 MMOL/L (ref 23–29)
CREAT SERPL-MCNC: 0.7 MG/DL (ref 0.5–1.4)
ERYTHROCYTE [DISTWIDTH] IN BLOOD BY AUTOMATED COUNT: 13.5 % (ref 11.5–14.5)
GFR SERPLBLD CREATININE-BSD FMLA CKD-EPI: >60 ML/MIN/1.73/M2
GLUCOSE SERPL-MCNC: 85 MG/DL (ref 70–110)
HCT VFR BLD AUTO: 38 % (ref 37–48.5)
HGB BLD-MCNC: 12.3 GM/DL (ref 12–16)
IMM GRANULOCYTES # BLD AUTO: 0.03 K/UL (ref 0–0.04)
IMM GRANULOCYTES NFR BLD AUTO: 0.4 % (ref 0–0.5)
LYMPHOCYTES # BLD AUTO: 3.53 K/UL (ref 1–4.8)
MAGNESIUM SERPL-MCNC: 2 MG/DL (ref 1.6–2.6)
MCH RBC QN AUTO: 27.6 PG (ref 27–31)
MCHC RBC AUTO-ENTMCNC: 32.4 G/DL (ref 32–36)
MCV RBC AUTO: 85 FL (ref 82–98)
NUCLEATED RBC (/100WBC) (SMH): 0 /100 WBC
PLATELET # BLD AUTO: 310 K/UL (ref 150–450)
PMV BLD AUTO: 9.4 FL (ref 9.2–12.9)
POTASSIUM SERPL-SCNC: 3.7 MMOL/L (ref 3.5–5.1)
PROT SERPL-MCNC: 7.4 GM/DL (ref 6–8.4)
RBC # BLD AUTO: 4.46 M/UL (ref 4–5.4)
RELATIVE EOSINOPHIL (SMH): 4.4 % (ref 0–8)
RELATIVE LYMPHOCYTE (SMH): 42.7 % (ref 18–48)
RELATIVE MONOCYTE (SMH): 5.1 % (ref 4–15)
RELATIVE NEUTROPHIL (SMH): 46.6 % (ref 38–73)
SODIUM SERPL-SCNC: 137 MMOL/L (ref 136–145)
TROPONIN HIGH SENSITIVE (SMH): 4.7 PG/ML
WBC # BLD AUTO: 8.27 K/UL (ref 3.9–12.7)

## 2025-04-14 PROCEDURE — 36415 COLL VENOUS BLD VENIPUNCTURE: CPT | Performed by: STUDENT IN AN ORGANIZED HEALTH CARE EDUCATION/TRAINING PROGRAM

## 2025-04-14 PROCEDURE — 83880 ASSAY OF NATRIURETIC PEPTIDE: CPT | Performed by: STUDENT IN AN ORGANIZED HEALTH CARE EDUCATION/TRAINING PROGRAM

## 2025-04-14 PROCEDURE — 84484 ASSAY OF TROPONIN QUANT: CPT | Performed by: STUDENT IN AN ORGANIZED HEALTH CARE EDUCATION/TRAINING PROGRAM

## 2025-04-14 PROCEDURE — 93005 ELECTROCARDIOGRAM TRACING: CPT | Performed by: GENERAL PRACTICE

## 2025-04-14 PROCEDURE — 83735 ASSAY OF MAGNESIUM: CPT | Performed by: STUDENT IN AN ORGANIZED HEALTH CARE EDUCATION/TRAINING PROGRAM

## 2025-04-14 PROCEDURE — 80053 COMPREHEN METABOLIC PANEL: CPT | Performed by: STUDENT IN AN ORGANIZED HEALTH CARE EDUCATION/TRAINING PROGRAM

## 2025-04-14 PROCEDURE — 93010 ELECTROCARDIOGRAM REPORT: CPT | Mod: ,,, | Performed by: GENERAL PRACTICE

## 2025-04-14 PROCEDURE — 85025 COMPLETE CBC W/AUTO DIFF WBC: CPT | Performed by: STUDENT IN AN ORGANIZED HEALTH CARE EDUCATION/TRAINING PROGRAM

## 2025-04-14 PROCEDURE — 99285 EMERGENCY DEPT VISIT HI MDM: CPT | Mod: 25

## 2025-04-14 NOTE — DISCHARGE INSTRUCTIONS
Follow up with primary care physician and Cardiology for further management evaluation, return to ED for any worsening symptoms

## 2025-04-14 NOTE — ED PROVIDER NOTES
Encounter Date: 2025       History     Chief Complaint   Patient presents with    Hypertension     Headache and high blood pressure x 1 day     39-year-old female presents for evaluation of headache, also fluctuating blood pressure and intermittent bradycardia.  No definitive chest pain or shortness for breath.  Collateral history obtained from mother.  No associated neurological deficits or infectious symptoms    The history is provided by the patient and a parent.     Review of patient's allergies indicates:  No Known Allergies  Past Medical History:   Diagnosis Date    Goiter, non-toxic     Herpes simplex     Hypertension     PCOS (polycystic ovarian syndrome)     Right bundle branch block 2019    S/P bilateral breast reduction      Past Surgical History:   Procedure Laterality Date    BARIATRIC SURGERY  2021    gastric sleeve    BILATERAL TUBAL LIGATION  2022     SECTION  2009     SECTION  2016    EUA; Excision of Anal Polyp  10/15/2018        TOTAL REDUCTION MAMMOPLASTY Bilateral 2021     Family History   Problem Relation Name Age of Onset    Hypertension Mother      Breast cancer Maternal Aunt      Hypertension Maternal Aunt      Heart disease Maternal Grandmother      Heart disease Paternal Uncle       Social History[1]  Review of Systems   All other systems reviewed and are negative.      Physical Exam     Initial Vitals [25 1640]   BP Pulse Resp Temp SpO2   (!) 172/139 (!) 59 17 98.1 °F (36.7 °C) 100 %      MAP       --         Physical Exam    Nursing note and vitals reviewed.  Constitutional: No distress.   HENT:   Head: Normocephalic.   Eyes: No scleral icterus.   Cardiovascular:  Normal rate.           Pulmonary/Chest: Effort normal. No stridor. No respiratory distress.   Abdominal: There is no guarding.     Neurological: She is alert.   GCS 15 with no focal deficits   Skin: No rash noted. No erythema.         ED Course    Procedures  Labs Reviewed   COMPREHENSIVE METABOLIC PANEL - Abnormal       Result Value    Sodium 137      Potassium 3.7      Chloride 107      CO2 22 (*)     Glucose 85      BUN 12      Creatinine 0.7      Calcium 9.4      Protein Total 7.4      Albumin 4.4      Bilirubin Total 0.4      ALP 50 (*)     AST 17      ALT 12      Anion Gap 8      eGFR >60     MAGNESIUM - Normal    Magnesium 2.0     TROPONIN I HIGH SENSITIVITY - Normal    Troponin High Sensitive 4.7     B-TYPE NATRIURETIC PEPTIDE - Normal    BNP 40     CBC WITH DIFFERENTIAL - Normal    WBC 8.27      RBC 4.46      Hgb 12.3      Hct 38.0      MCV 85      MCH 27.6      MCHC 32.4      RDW 13.5      Platelet Count 310      MPV 9.4      Nucleated RBC 0      Neut % 46.6      Lymph % 42.7      Mono % 5.1      Eos % 4.4      Basophil % 0.8      Imm Grans % 0.4      Neut # 3.9      Lymph # 3.53      Mono # 0.42      Eos # 0.36      Baso # 0.07      Imm Grans # 0.03     CBC W/ AUTO DIFFERENTIAL    Narrative:     The following orders were created for panel order CBC auto differential.  Procedure                               Abnormality         Status                     ---------                               -----------         ------                     CBC with Differential[[id number]]       Normal              Final result                 Please view results for these tests on the individual orders.   EXTRA TUBES    Narrative:     The following orders were created for panel order EXTRA TUBES.  Procedure                               Abnormality         Status                     ---------                               -----------         ------                     Light Blue Top Hold[[id number]]                             In process                 Gold Top Hold[[id number]]                                   In process                   Please view results for these tests on the individual orders.   LIGHT BLUE TOP HOLD   GOLD TOP HOLD   POCT URINE PREGNANCY           Imaging Results              X-Ray Chest AP Portable (Final result)  Result time 04/14/25 16:58:32      Final result by Jaja Pederson MD (04/14/25 16:58:32)                   Impression:      No acute cardiopulmonary abnormality.      Electronically signed by: Jaja Pederson  Date:    04/14/2025  Time:    16:58               Narrative:    EXAMINATION:  XR CHEST AP PORTABLE    CLINICAL HISTORY:  Chest Pain;    FINDINGS:  Portable chest at 16:57 hours is compared to 08/17/2019 shows normal cardiomediastinal silhouette.    Lungs are clear. Pulmonary vasculature is normal. No acute osseous abnormality.                                       Medications - No data to display  Medical Decision Making  39-year-old female with hypertension and headache, GCS 15, do not suspect intracranial hemorrhage.  Through shared decision-making with patient will defer CT head to rule out intracranial hemorrhage, do not suspect any CNS infection.  Lab work obtained with no evidence of end-organ damage.  EKG with sinus bradycardia.  Patient with fluctuating blood pressure here in ED throughout visit.  No evidence of hypertensive emergency.  Unsure exact cause of bradycardia.  Heart rate fluctuating from 50s to 60s and lowest of 49.  Given unsure cause of symptoms and fluctuating blood pressure, offered admission for further observation.  Through shared decision-making patient declined and will follow up with outpatient primary care physician and will provide cardiology referral with strict return precautions.  Patient and mother voiced understanding and agree with plan.    Amount and/or Complexity of Data Reviewed  Independent Historian: parent  Labs: ordered. Decision-making details documented in ED Course.  Radiology: ordered.  ECG/medicine tests: ordered and independent interpretation performed.     Details: EKG rate 58, sinus bradycardia, , , no STEMI               ED Course as of 04/14/25 1857 Mon Apr  14, 2025   1732 Patient declines pregnancy test [KB]   1732 Has capacity to make her decision [KB]   1746 WBC: 8.27 [KB]   1746 Hemoglobin: 12.3 [KB]   1746 Hematocrit: 38.0 [KB]   1809 BNP: 40 [KB]   1809 Troponin I High Sensitivity: 4.7 [KB]      ED Course User Index  [KB] Enzo Griffith Jr., DO                           Clinical Impression:  Final diagnoses:  [I10] Hypertension, unspecified type (Primary)  [R00.1] Bradycardia          ED Disposition Condition    Discharge Stable          ED Prescriptions    None       Follow-up Information    None              [1]   Social History  Tobacco Use    Smoking status: Never    Smokeless tobacco: Never   Substance Use Topics    Alcohol use: Not Currently    Drug use: No        Enzo Griffith Jr., DO  04/14/25 [id number]

## 2025-04-14 NOTE — TELEPHONE ENCOUNTER
Have pt increase her olmesartan to 40mg daily and. It takes a few days for this dose to normalize.

## 2025-04-15 ENCOUNTER — PATIENT MESSAGE (OUTPATIENT)
Dept: ADMINISTRATIVE | Facility: CLINIC | Age: 40
End: 2025-04-15
Payer: COMMERCIAL

## 2025-04-15 LAB
OHS QRS DURATION: 144 MS
OHS QTC CALCULATION: 457 MS

## 2025-04-15 NOTE — TELEPHONE ENCOUNTER
Let pt know this is an improvement. Have her check in tomorrow morning with her BP after she takes her medication to see if any other med needs to be added. Also find out if she was able to make an appt with Cards yet for her low HR.

## 2025-04-16 VITALS — SYSTOLIC BLOOD PRESSURE: 127 MMHG | DIASTOLIC BLOOD PRESSURE: 78 MMHG

## 2025-04-16 RX ORDER — OLMESARTAN MEDOXOMIL 40 MG/1
40 TABLET ORAL DAILY
Qty: 90 TABLET | Refills: 1 | Status: SHIPPED | OUTPATIENT
Start: 2025-04-16 | End: 2026-04-16